# Patient Record
Sex: FEMALE | Race: WHITE | NOT HISPANIC OR LATINO | Employment: OTHER | URBAN - METROPOLITAN AREA
[De-identification: names, ages, dates, MRNs, and addresses within clinical notes are randomized per-mention and may not be internally consistent; named-entity substitution may affect disease eponyms.]

---

## 2017-04-04 ENCOUNTER — TELEPHONE (OUTPATIENT)
Dept: PHARMACY | Facility: CLINIC | Age: 62
End: 2017-04-04

## 2017-04-18 ENCOUNTER — TELEPHONE (OUTPATIENT)
Dept: PHARMACY | Facility: CLINIC | Age: 62
End: 2017-04-18

## 2017-04-18 NOTE — TELEPHONE ENCOUNTER
Congratulations    You have successfully registered for a HARVONI co-pay coupon. A copy of your co-pay coupon will be sent to the email or mailing address you provided. You may also print a copy of your co-pay coupon now. Simply click on Download co-pay coupon below to print your copy. Your unique co-pay coupon information is:    BIN: [id number]  RxPCN: [id number]  Issuer: ([id number]  Group Number: [id number]  ID#: [id number]    [facility]  [facility]  Telephone: [phone]  Fax: [phone]

## 2017-04-18 NOTE — TELEPHONE ENCOUNTER
FOR DOCUMENTATION ONLY:  Aly prior authorization approved x 8 weeks  4/17/17 through 6/12/17    [facility] is out of network.   Patient must use [facility], [facility], or [facility] pharmacies.

## 2017-05-02 NOTE — TELEPHONE ENCOUNTER
Patient mentions headaches and fatigue. She would like to take Motrin OTC for the headaches - advised that this should be fine, but if she takes tylenol, not to exceed >2000mg in 24 hours and stay well hydrated. I stressed that she should try to go about her daily activities and hopefully both side effects will dissipate. She mentions the feeling as if her BS or BP is low, that's how tired she is. It's been a while since she has seen her PCP, I advised her to make an appt for PCP to assess.

## 2020-05-11 ENCOUNTER — NURSE TRIAGE (OUTPATIENT)
Dept: ADMINISTRATIVE | Facility: CLINIC | Age: 65
End: 2020-05-11

## 2020-05-11 NOTE — TELEPHONE ENCOUNTER
Pt states in relationship with someone that has to go into other people houses and wondering he can come stay at home.Pt states she does not have any symptoms.  Informed pt that I cannot give her advice on partner coming home.    Reason for Disposition   General information question, no triage required and triager able to answer question    Protocols used: INFORMATION ONLY CALL-A-AH

## 2020-10-13 LAB — BMD RECOMMENDATION EXT: NORMAL

## 2022-03-10 ENCOUNTER — TELEPHONE (OUTPATIENT)
Dept: INTERNAL MEDICINE | Facility: CLINIC | Age: 67
End: 2022-03-10
Payer: MEDICARE

## 2022-03-21 ENCOUNTER — OFFICE VISIT (OUTPATIENT)
Dept: INTERNAL MEDICINE | Facility: CLINIC | Age: 67
End: 2022-03-21
Payer: MEDICARE

## 2022-03-21 ENCOUNTER — LAB VISIT (OUTPATIENT)
Dept: LAB | Facility: OTHER | Age: 67
End: 2022-03-21
Attending: STUDENT IN AN ORGANIZED HEALTH CARE EDUCATION/TRAINING PROGRAM
Payer: MEDICARE

## 2022-03-21 VITALS
OXYGEN SATURATION: 98 % | HEIGHT: 61 IN | SYSTOLIC BLOOD PRESSURE: 130 MMHG | DIASTOLIC BLOOD PRESSURE: 86 MMHG | BODY MASS INDEX: 20.73 KG/M2 | WEIGHT: 109.81 LBS | HEART RATE: 62 BPM

## 2022-03-21 DIAGNOSIS — B18.2 CHRONIC HEPATITIS C WITHOUT HEPATIC COMA: ICD-10-CM

## 2022-03-21 DIAGNOSIS — N95.9 POST MENOPAUSAL PROBLEMS: ICD-10-CM

## 2022-03-21 DIAGNOSIS — Z13.220 ENCOUNTER FOR LIPID SCREENING FOR CARDIOVASCULAR DISEASE: ICD-10-CM

## 2022-03-21 DIAGNOSIS — Z00.00 ANNUAL PHYSICAL EXAM: ICD-10-CM

## 2022-03-21 DIAGNOSIS — G47.00 INSOMNIA, UNSPECIFIED TYPE: ICD-10-CM

## 2022-03-21 DIAGNOSIS — Z20.2 EXPOSURE TO STD: ICD-10-CM

## 2022-03-21 DIAGNOSIS — Z00.00 ANNUAL PHYSICAL EXAM: Primary | ICD-10-CM

## 2022-03-21 DIAGNOSIS — Z12.31 ENCOUNTER FOR SCREENING MAMMOGRAM FOR MALIGNANT NEOPLASM OF BREAST: ICD-10-CM

## 2022-03-21 DIAGNOSIS — D70.8 OTHER NEUTROPENIA: ICD-10-CM

## 2022-03-21 DIAGNOSIS — Z12.11 SCREENING FOR MALIGNANT NEOPLASM OF COLON: ICD-10-CM

## 2022-03-21 DIAGNOSIS — Z13.6 ENCOUNTER FOR LIPID SCREENING FOR CARDIOVASCULAR DISEASE: ICD-10-CM

## 2022-03-21 DIAGNOSIS — M81.6 LOCALIZED OSTEOPOROSIS WITHOUT CURRENT PATHOLOGICAL FRACTURE: ICD-10-CM

## 2022-03-21 LAB
ALBUMIN SERPL BCP-MCNC: 4 G/DL (ref 3.5–5.2)
ALP SERPL-CCNC: 79 U/L (ref 55–135)
ALT SERPL W/O P-5'-P-CCNC: 12 U/L (ref 10–44)
ANION GAP SERPL CALC-SCNC: 8 MMOL/L (ref 8–16)
AST SERPL-CCNC: 17 U/L (ref 10–40)
BASOPHILS # BLD AUTO: 0.03 K/UL (ref 0–0.2)
BASOPHILS NFR BLD: 0.8 % (ref 0–1.9)
BILIRUB SERPL-MCNC: 0.6 MG/DL (ref 0.1–1)
BUN SERPL-MCNC: 14 MG/DL (ref 8–23)
CALCIUM SERPL-MCNC: 9.3 MG/DL (ref 8.7–10.5)
CHLORIDE SERPL-SCNC: 109 MMOL/L (ref 95–110)
CHOLEST SERPL-MCNC: 199 MG/DL (ref 120–199)
CHOLEST/HDLC SERPL: 2.4 {RATIO} (ref 2–5)
CO2 SERPL-SCNC: 23 MMOL/L (ref 23–29)
CREAT SERPL-MCNC: 0.7 MG/DL (ref 0.5–1.4)
DIFFERENTIAL METHOD: ABNORMAL
EOSINOPHIL # BLD AUTO: 0.2 K/UL (ref 0–0.5)
EOSINOPHIL NFR BLD: 4.8 % (ref 0–8)
ERYTHROCYTE [DISTWIDTH] IN BLOOD BY AUTOMATED COUNT: 12.3 % (ref 11.5–14.5)
EST. GFR  (AFRICAN AMERICAN): >60 ML/MIN/1.73 M^2
EST. GFR  (NON AFRICAN AMERICAN): >60 ML/MIN/1.73 M^2
GLUCOSE SERPL-MCNC: 90 MG/DL (ref 70–110)
HCT VFR BLD AUTO: 39.8 % (ref 37–48.5)
HDLC SERPL-MCNC: 83 MG/DL (ref 40–75)
HDLC SERPL: 41.7 % (ref 20–50)
HGB BLD-MCNC: 13.9 G/DL (ref 12–16)
IMM GRANULOCYTES # BLD AUTO: 0.01 K/UL (ref 0–0.04)
IMM GRANULOCYTES NFR BLD AUTO: 0.3 % (ref 0–0.5)
LDLC SERPL CALC-MCNC: 105 MG/DL (ref 63–159)
LYMPHOCYTES # BLD AUTO: 1 K/UL (ref 1–4.8)
LYMPHOCYTES NFR BLD: 27.3 % (ref 18–48)
MCH RBC QN AUTO: 32.9 PG (ref 27–31)
MCHC RBC AUTO-ENTMCNC: 34.9 G/DL (ref 32–36)
MCV RBC AUTO: 94 FL (ref 82–98)
MONOCYTES # BLD AUTO: 0.4 K/UL (ref 0.3–1)
MONOCYTES NFR BLD: 11.2 % (ref 4–15)
NEUTROPHILS # BLD AUTO: 2.1 K/UL (ref 1.8–7.7)
NEUTROPHILS NFR BLD: 55.6 % (ref 38–73)
NONHDLC SERPL-MCNC: 116 MG/DL
NRBC BLD-RTO: 0 /100 WBC
PLATELET # BLD AUTO: 232 K/UL (ref 150–450)
PMV BLD AUTO: 10.1 FL (ref 9.2–12.9)
POTASSIUM SERPL-SCNC: 4.2 MMOL/L (ref 3.5–5.1)
PROT SERPL-MCNC: 6.7 G/DL (ref 6–8.4)
RBC # BLD AUTO: 4.22 M/UL (ref 4–5.4)
SODIUM SERPL-SCNC: 140 MMOL/L (ref 136–145)
TRIGL SERPL-MCNC: 55 MG/DL (ref 30–150)
TSH SERPL DL<=0.005 MIU/L-ACNC: 1.28 UIU/ML (ref 0.4–4)
WBC # BLD AUTO: 3.74 K/UL (ref 3.9–12.7)

## 2022-03-21 PROCEDURE — 36415 COLL VENOUS BLD VENIPUNCTURE: CPT | Performed by: STUDENT IN AN ORGANIZED HEALTH CARE EDUCATION/TRAINING PROGRAM

## 2022-03-21 PROCEDURE — 87389 HIV-1 AG W/HIV-1&-2 AB AG IA: CPT | Performed by: STUDENT IN AN ORGANIZED HEALTH CARE EDUCATION/TRAINING PROGRAM

## 2022-03-21 PROCEDURE — 3288F FALL RISK ASSESSMENT DOCD: CPT | Mod: CPTII,S$GLB,, | Performed by: STUDENT IN AN ORGANIZED HEALTH CARE EDUCATION/TRAINING PROGRAM

## 2022-03-21 PROCEDURE — 87522 HEPATITIS C REVRS TRNSCRPJ: CPT | Performed by: STUDENT IN AN ORGANIZED HEALTH CARE EDUCATION/TRAINING PROGRAM

## 2022-03-21 PROCEDURE — 1101F PT FALLS ASSESS-DOCD LE1/YR: CPT | Mod: CPTII,S$GLB,, | Performed by: STUDENT IN AN ORGANIZED HEALTH CARE EDUCATION/TRAINING PROGRAM

## 2022-03-21 PROCEDURE — 3079F PR MOST RECENT DIASTOLIC BLOOD PRESSURE 80-89 MM HG: ICD-10-PCS | Mod: CPTII,S$GLB,, | Performed by: STUDENT IN AN ORGANIZED HEALTH CARE EDUCATION/TRAINING PROGRAM

## 2022-03-21 PROCEDURE — 99204 OFFICE O/P NEW MOD 45 MIN: CPT | Mod: S$GLB,,, | Performed by: STUDENT IN AN ORGANIZED HEALTH CARE EDUCATION/TRAINING PROGRAM

## 2022-03-21 PROCEDURE — 3075F PR MOST RECENT SYSTOLIC BLOOD PRESS GE 130-139MM HG: ICD-10-PCS | Mod: CPTII,S$GLB,, | Performed by: STUDENT IN AN ORGANIZED HEALTH CARE EDUCATION/TRAINING PROGRAM

## 2022-03-21 PROCEDURE — 3008F PR BODY MASS INDEX (BMI) DOCUMENTED: ICD-10-PCS | Mod: CPTII,S$GLB,, | Performed by: STUDENT IN AN ORGANIZED HEALTH CARE EDUCATION/TRAINING PROGRAM

## 2022-03-21 PROCEDURE — 1101F PR PT FALLS ASSESS DOC 0-1 FALLS W/OUT INJ PAST YR: ICD-10-PCS | Mod: CPTII,S$GLB,, | Performed by: STUDENT IN AN ORGANIZED HEALTH CARE EDUCATION/TRAINING PROGRAM

## 2022-03-21 PROCEDURE — 99204 PR OFFICE/OUTPT VISIT, NEW, LEVL IV, 45-59 MIN: ICD-10-PCS | Mod: S$GLB,,, | Performed by: STUDENT IN AN ORGANIZED HEALTH CARE EDUCATION/TRAINING PROGRAM

## 2022-03-21 PROCEDURE — 99499 RISK ADDL DX/OHS AUDIT: ICD-10-PCS | Mod: S$GLB,,, | Performed by: STUDENT IN AN ORGANIZED HEALTH CARE EDUCATION/TRAINING PROGRAM

## 2022-03-21 PROCEDURE — 80061 LIPID PANEL: CPT | Performed by: STUDENT IN AN ORGANIZED HEALTH CARE EDUCATION/TRAINING PROGRAM

## 2022-03-21 PROCEDURE — 80053 COMPREHEN METABOLIC PANEL: CPT | Performed by: STUDENT IN AN ORGANIZED HEALTH CARE EDUCATION/TRAINING PROGRAM

## 2022-03-21 PROCEDURE — 86592 SYPHILIS TEST NON-TREP QUAL: CPT | Performed by: STUDENT IN AN ORGANIZED HEALTH CARE EDUCATION/TRAINING PROGRAM

## 2022-03-21 PROCEDURE — 99499 UNLISTED E&M SERVICE: CPT | Mod: S$GLB,,, | Performed by: STUDENT IN AN ORGANIZED HEALTH CARE EDUCATION/TRAINING PROGRAM

## 2022-03-21 PROCEDURE — 84443 ASSAY THYROID STIM HORMONE: CPT | Performed by: STUDENT IN AN ORGANIZED HEALTH CARE EDUCATION/TRAINING PROGRAM

## 2022-03-21 PROCEDURE — 99999 PR PBB SHADOW E&M-EST. PATIENT-LVL III: ICD-10-PCS | Mod: PBBFAC,,, | Performed by: STUDENT IN AN ORGANIZED HEALTH CARE EDUCATION/TRAINING PROGRAM

## 2022-03-21 PROCEDURE — 1126F AMNT PAIN NOTED NONE PRSNT: CPT | Mod: CPTII,S$GLB,, | Performed by: STUDENT IN AN ORGANIZED HEALTH CARE EDUCATION/TRAINING PROGRAM

## 2022-03-21 PROCEDURE — 3079F DIAST BP 80-89 MM HG: CPT | Mod: CPTII,S$GLB,, | Performed by: STUDENT IN AN ORGANIZED HEALTH CARE EDUCATION/TRAINING PROGRAM

## 2022-03-21 PROCEDURE — 3075F SYST BP GE 130 - 139MM HG: CPT | Mod: CPTII,S$GLB,, | Performed by: STUDENT IN AN ORGANIZED HEALTH CARE EDUCATION/TRAINING PROGRAM

## 2022-03-21 PROCEDURE — 1159F MED LIST DOCD IN RCRD: CPT | Mod: CPTII,S$GLB,, | Performed by: STUDENT IN AN ORGANIZED HEALTH CARE EDUCATION/TRAINING PROGRAM

## 2022-03-21 PROCEDURE — 3288F PR FALLS RISK ASSESSMENT DOCUMENTED: ICD-10-PCS | Mod: CPTII,S$GLB,, | Performed by: STUDENT IN AN ORGANIZED HEALTH CARE EDUCATION/TRAINING PROGRAM

## 2022-03-21 PROCEDURE — 3008F BODY MASS INDEX DOCD: CPT | Mod: CPTII,S$GLB,, | Performed by: STUDENT IN AN ORGANIZED HEALTH CARE EDUCATION/TRAINING PROGRAM

## 2022-03-21 PROCEDURE — 1159F PR MEDICATION LIST DOCUMENTED IN MEDICAL RECORD: ICD-10-PCS | Mod: CPTII,S$GLB,, | Performed by: STUDENT IN AN ORGANIZED HEALTH CARE EDUCATION/TRAINING PROGRAM

## 2022-03-21 PROCEDURE — 85025 COMPLETE CBC W/AUTO DIFF WBC: CPT | Performed by: STUDENT IN AN ORGANIZED HEALTH CARE EDUCATION/TRAINING PROGRAM

## 2022-03-21 PROCEDURE — 99999 PR PBB SHADOW E&M-EST. PATIENT-LVL III: CPT | Mod: PBBFAC,,, | Performed by: STUDENT IN AN ORGANIZED HEALTH CARE EDUCATION/TRAINING PROGRAM

## 2022-03-21 PROCEDURE — 1126F PR PAIN SEVERITY QUANTIFIED, NO PAIN PRESENT: ICD-10-PCS | Mod: CPTII,S$GLB,, | Performed by: STUDENT IN AN ORGANIZED HEALTH CARE EDUCATION/TRAINING PROGRAM

## 2022-03-21 NOTE — PROGRESS NOTES
"  [facility]    Subjective:       Patient ID: Anh San is a 66 y.o. female.    Chief Complaint: Establish Care      History was obtained from the patient and supplemented through chart review.  This patient has not been seen by an [facility] internal medicine physician in the past 3 years and is new to me.    HPI:    Patient is a 66 y.o. female who presents to establish care.    Elevated BP without diagnosis of HTN  130/86, encouraged pt to monitor at home    Vegetarian since age 16  "not a big eater"   Started shakes for improved nutrition approx 2 years ago    Neutropenia  Has seen hematology in the past, was told to f/u with them  Had very expensive lab work in the past  Was concerned and worked up as a kid; there had been a biopsy in the past    Hx of extreme vertigo  Low phosphorus    Hx of hep c s/p treatment  Check RNA    Acupuncturist  Helps with recent foot exercise    Osteoporosis  Taking K2, ca from algea, ashwagagonda, kersitin, tumeric, superbeets  Sprints and walks with dog  Yoga in the past  Wants to avoid real osteoporosis med; wants to increase weight-based exercises   Declines fosamax or alternative; dislikes the side effects  Was slowed by foot injury; willing to reconsider the issue in the future    Hx of carpal tunnel     Takes cbd evening    Very interested in holistic treatments, not interested in invasive treatments  Wants to ride horses, climbs scaffolding, working to be safer, ceiling     Medical History  History reviewed. No pertinent past medical history.     Review of Systems   Constitutional: Negative for fever.   HENT: Negative for trouble swallowing.    Respiratory: Negative for shortness of breath.    Cardiovascular: Negative for chest pain.   Gastrointestinal: Negative for constipation, diarrhea, nausea and vomiting.   Musculoskeletal: Positive for arthralgias (right- improved). Negative for gait problem.        Arthalgias in general improved " "  Neurological: Negative for dizziness and seizures.   Psychiatric/Behavioral: Negative for hallucinations.         Surgical hx, family hx, social hx   Have been reviewed    No current outpatient medications on file.    Objective:        Body mass index is 20.74 kg/m².  Vitals:    03/21/22 1204   BP: 130/86   Pulse: 62   SpO2: 98%   Weight: 49.8 kg (109 lb 12.6 oz)   Height: 5' 1" (1.549 m)   PainSc: 0-No pain     Physical Exam  Vitals and nursing note reviewed.   Constitutional:       General: She is not in acute distress.     Appearance: Normal appearance. She is not ill-appearing.   HENT:      Head: Normocephalic and atraumatic.      Nose:      Comments: Wearing a mask  Eyes:      General: No scleral icterus.  Pulmonary:      Effort: Pulmonary effort is normal.   Abdominal:      General: There is no distension.   Musculoskeletal:         General: No deformity.      Cervical back: Normal range of motion.   Skin:     General: Skin is warm and dry.   Neurological:      Mental Status: She is alert and oriented to person, place, and time.   Psychiatric:         Behavior: Behavior normal.           Lab Results   Component Value Date    WBC 3.74 (L) 03/21/2022    HGB 13.9 03/21/2022    HCT 39.8 03/21/2022     03/21/2022    ALT 12 03/21/2022    AST 17 03/21/2022     03/21/2022    K 4.2 03/21/2022     03/21/2022    CREATININE 0.7 03/21/2022    BUN 14 03/21/2022    CO2 23 03/21/2022    TSH 1.280 03/21/2022       The ASCVD Risk score (Witten DC Jr., et al., 2013) failed to calculate for the following reasons:    Cannot find a previous HDL lab    Cannot find a previous total cholesterol lab    (Imaging have been independently reviewed)    Assessment:         1. Annual physical exam    2. Exposure to STD    3. Chronic hepatitis C without hepatic coma    4. Other neutropenia    5. Localized osteoporosis without current pathological fracture    6. Encounter for lipid screening for cardiovascular disease    7. " Insomnia, unspecified type    8. Encounter for screening mammogram for malignant neoplasm of breast    9. Post menopausal problems    10. Screening for malignant neoplasm of colon          Plan:     Anh was seen today for establish care.    Diagnoses and all orders for this visit:    Annual physical exam  -     Comprehensive Metabolic Panel; Future    Exposure to STD  -     RPR; Future  -     HIV 1/2 Ag/Ab (4th Gen); Future  -     C. trachomatis/N. gonorrhoeae by AMP DNA [facility]; Urine; Future    Chronic hepatitis C without hepatic coma  -     HEPATITIS C RNA, QUANTITATIVE, PCR; Future    Other neutropenia  -     CBC Auto Differential; Future  -     Ambulatory referral/consult to Hematology / Oncology; Future    Localized osteoporosis without current pathological fracture    Encounter for lipid screening for cardiovascular disease  -     Lipid Panel; Future    Insomnia, unspecified type  -     TSH; Future    Encounter for screening mammogram for malignant neoplasm of breast  -     Mammo Digital Screening Bilat; Future    Post menopausal problems  -     DXA Bone Density Spine And Hip; Future    Screening for malignant neoplasm of colon  -     Cologuard Screening (Multitarget Stool DNA); Future  -     Cologuard Screening (Multitarget Stool DNA)        Health Maintenance  - Lipids: ordered  - A1C: ordered  - Colon Ca Screen:  cologuard  - Immunizations: cov vax and boosted    Women's health  - Pap:   - Mammo: ordered  - Dexa: Due Dec 2022; pt may want to recheck early this summer    Follow up in about 6 months (around 9/21/2022).        All medications were reviewed including potential side effects and risks/benefits.  Pt was counseled to call back if anything worsens or if questions arise.    Darvin Arreola MD  Family Medicine  [facility]  [address] [place] [id number]  Phone [phone]  Fax [phone]

## 2022-03-23 LAB
HIV 1+2 AB+HIV1 P24 AG SERPL QL IA: NEGATIVE
RPR SER QL: NORMAL

## 2022-03-24 LAB — HEPATITIS C VIRUS (HCV) RNA DETECTION/QUANTIFICATION RT-PCR: NORMAL IU/ML

## 2022-04-12 ENCOUNTER — OFFICE VISIT (OUTPATIENT)
Dept: HEMATOLOGY/ONCOLOGY | Facility: CLINIC | Age: 67
End: 2022-04-12
Payer: MEDICARE

## 2022-04-12 VITALS
RESPIRATION RATE: 16 BRPM | SYSTOLIC BLOOD PRESSURE: 123 MMHG | OXYGEN SATURATION: 99 % | WEIGHT: 110 LBS | HEART RATE: 60 BPM | TEMPERATURE: 97 F | BODY MASS INDEX: 20.77 KG/M2 | DIASTOLIC BLOOD PRESSURE: 81 MMHG | HEIGHT: 61 IN

## 2022-04-12 DIAGNOSIS — B18.2 CHRONIC HEPATITIS C WITHOUT HEPATIC COMA: ICD-10-CM

## 2022-04-12 DIAGNOSIS — D72.819 LEUKOPENIA, UNSPECIFIED TYPE: Primary | ICD-10-CM

## 2022-04-12 PROCEDURE — 3288F PR FALLS RISK ASSESSMENT DOCUMENTED: ICD-10-PCS | Mod: CPTII,S$GLB,, | Performed by: STUDENT IN AN ORGANIZED HEALTH CARE EDUCATION/TRAINING PROGRAM

## 2022-04-12 PROCEDURE — 1101F PR PT FALLS ASSESS DOC 0-1 FALLS W/OUT INJ PAST YR: ICD-10-PCS | Mod: CPTII,S$GLB,, | Performed by: STUDENT IN AN ORGANIZED HEALTH CARE EDUCATION/TRAINING PROGRAM

## 2022-04-12 PROCEDURE — 1101F PT FALLS ASSESS-DOCD LE1/YR: CPT | Mod: CPTII,S$GLB,, | Performed by: STUDENT IN AN ORGANIZED HEALTH CARE EDUCATION/TRAINING PROGRAM

## 2022-04-12 PROCEDURE — 3079F PR MOST RECENT DIASTOLIC BLOOD PRESSURE 80-89 MM HG: ICD-10-PCS | Mod: CPTII,S$GLB,, | Performed by: STUDENT IN AN ORGANIZED HEALTH CARE EDUCATION/TRAINING PROGRAM

## 2022-04-12 PROCEDURE — 1126F PR PAIN SEVERITY QUANTIFIED, NO PAIN PRESENT: ICD-10-PCS | Mod: CPTII,S$GLB,, | Performed by: STUDENT IN AN ORGANIZED HEALTH CARE EDUCATION/TRAINING PROGRAM

## 2022-04-12 PROCEDURE — 3074F PR MOST RECENT SYSTOLIC BLOOD PRESSURE < 130 MM HG: ICD-10-PCS | Mod: CPTII,S$GLB,, | Performed by: STUDENT IN AN ORGANIZED HEALTH CARE EDUCATION/TRAINING PROGRAM

## 2022-04-12 PROCEDURE — 99999 PR PBB SHADOW E&M-EST. PATIENT-LVL III: ICD-10-PCS | Mod: PBBFAC,,, | Performed by: STUDENT IN AN ORGANIZED HEALTH CARE EDUCATION/TRAINING PROGRAM

## 2022-04-12 PROCEDURE — 3288F FALL RISK ASSESSMENT DOCD: CPT | Mod: CPTII,S$GLB,, | Performed by: STUDENT IN AN ORGANIZED HEALTH CARE EDUCATION/TRAINING PROGRAM

## 2022-04-12 PROCEDURE — 3079F DIAST BP 80-89 MM HG: CPT | Mod: CPTII,S$GLB,, | Performed by: STUDENT IN AN ORGANIZED HEALTH CARE EDUCATION/TRAINING PROGRAM

## 2022-04-12 PROCEDURE — 1160F RVW MEDS BY RX/DR IN RCRD: CPT | Mod: CPTII,S$GLB,, | Performed by: STUDENT IN AN ORGANIZED HEALTH CARE EDUCATION/TRAINING PROGRAM

## 2022-04-12 PROCEDURE — 3008F PR BODY MASS INDEX (BMI) DOCUMENTED: ICD-10-PCS | Mod: CPTII,S$GLB,, | Performed by: STUDENT IN AN ORGANIZED HEALTH CARE EDUCATION/TRAINING PROGRAM

## 2022-04-12 PROCEDURE — 1159F PR MEDICATION LIST DOCUMENTED IN MEDICAL RECORD: ICD-10-PCS | Mod: CPTII,S$GLB,, | Performed by: STUDENT IN AN ORGANIZED HEALTH CARE EDUCATION/TRAINING PROGRAM

## 2022-04-12 PROCEDURE — 3074F SYST BP LT 130 MM HG: CPT | Mod: CPTII,S$GLB,, | Performed by: STUDENT IN AN ORGANIZED HEALTH CARE EDUCATION/TRAINING PROGRAM

## 2022-04-12 PROCEDURE — 1160F PR REVIEW ALL MEDS BY PRESCRIBER/CLIN PHARMACIST DOCUMENTED: ICD-10-PCS | Mod: CPTII,S$GLB,, | Performed by: STUDENT IN AN ORGANIZED HEALTH CARE EDUCATION/TRAINING PROGRAM

## 2022-04-12 PROCEDURE — 99203 PR OFFICE/OUTPT VISIT, NEW, LEVL III, 30-44 MIN: ICD-10-PCS | Mod: S$GLB,,, | Performed by: STUDENT IN AN ORGANIZED HEALTH CARE EDUCATION/TRAINING PROGRAM

## 2022-04-12 PROCEDURE — 1126F AMNT PAIN NOTED NONE PRSNT: CPT | Mod: CPTII,S$GLB,, | Performed by: STUDENT IN AN ORGANIZED HEALTH CARE EDUCATION/TRAINING PROGRAM

## 2022-04-12 PROCEDURE — 99999 PR PBB SHADOW E&M-EST. PATIENT-LVL III: CPT | Mod: PBBFAC,,, | Performed by: STUDENT IN AN ORGANIZED HEALTH CARE EDUCATION/TRAINING PROGRAM

## 2022-04-12 PROCEDURE — 1159F MED LIST DOCD IN RCRD: CPT | Mod: CPTII,S$GLB,, | Performed by: STUDENT IN AN ORGANIZED HEALTH CARE EDUCATION/TRAINING PROGRAM

## 2022-04-12 PROCEDURE — 99203 OFFICE O/P NEW LOW 30 MIN: CPT | Mod: S$GLB,,, | Performed by: STUDENT IN AN ORGANIZED HEALTH CARE EDUCATION/TRAINING PROGRAM

## 2022-04-12 PROCEDURE — 3008F BODY MASS INDEX DOCD: CPT | Mod: CPTII,S$GLB,, | Performed by: STUDENT IN AN ORGANIZED HEALTH CARE EDUCATION/TRAINING PROGRAM

## 2022-04-12 NOTE — PROGRESS NOTES
Hematology- Oncology Clinic Note :      4/12/2022    RFV / chief complaint- Referral (Neutropenia.. being proactive. )        HPI  Pt is a 66 y.o. female who  has no past medical history on file.   Pt presents to the clinic today for leukopenia    Pt reports to be in her usual state of health. She wanted to be proactive and get evaluated.   She has long standing hx of leukopenia. She underwent BMBx at age 28 for leukopenia.     No recurrent infections. She has b symptoms. Good appetite.  Accompanied by son today.   She has been cured for Hep C     She drinks wine every night or so          Reviewed past medical/surgical/social history    History reviewed. No pertinent past medical history.   History reviewed. No pertinent surgical history.   Review of patient's allergies indicates:  No Known Allergies   Social History     Tobacco Use    Smoking status: Never Smoker    Smokeless tobacco: Never Used   Substance Use Topics    Alcohol use: Yes     Alcohol/week: 7.0 standard drinks     Types: 7 Glasses of wine per week      Family History   Problem Relation Age of Onset    Multiple sclerosis Mother         late onset    Hypertension Father     Hypertension Sister     Coronary artery disease Sister         stents    Heart attack Sister     HIV Brother     Stroke Maternal Grandmother     Colon cancer Neg Hx     Breast cancer Neg Hx     Ovarian cancer Neg Hx           Review of Systems :  Review of Systems   Constitutional: Negative for chills, diaphoresis, fever, malaise/fatigue and weight loss.   HENT: Negative.  Negative for congestion, hearing loss, nosebleeds, sore throat and tinnitus.    Eyes: Negative.  Negative for blurred vision and discharge.   Respiratory: Negative for cough, hemoptysis, sputum production, shortness of breath and wheezing.    Cardiovascular: Negative.  Negative for chest pain, palpitations and leg swelling.   Gastrointestinal: Negative.  Negative for abdominal pain, blood in stool,  "constipation, diarrhea, heartburn, melena, nausea and vomiting.   Genitourinary: Negative.    Musculoskeletal: Negative.  Negative for back pain, falls, joint pain and myalgias.   Skin: Negative.  Negative for itching and rash.   Neurological: Negative.  Negative for dizziness, tingling, sensory change, speech change, focal weakness, seizures, loss of consciousness, weakness and headaches.   Endo/Heme/Allergies: Negative.  Does not bruise/bleed easily.   Psychiatric/Behavioral: Negative.  Negative for depression. The patient is not nervous/anxious and does not have insomnia.                Physical Exam :  /81 (BP Location: Left arm, Patient Position: Sitting, BP Method: Small (Automatic))   Pulse 60   Temp 97.4 °F (36.3 °C) (Oral)   Resp 16   Ht 5' 1" (1.549 m)   Wt 49.9 kg (110 lb 0.2 oz)   SpO2 99%   BMI 20.79 kg/m²   Wt Readings from Last 3 Encounters:   04/12/22 49.9 kg (110 lb 0.2 oz)   03/21/22 49.8 kg (109 lb 12.6 oz)       Body mass index is 20.79 kg/m².      Physical Exam  Vitals and nursing note reviewed.   Constitutional:       General: She is not in acute distress.     Appearance: Normal appearance. She is not ill-appearing.   HENT:      Head: Normocephalic and atraumatic.      Right Ear: External ear normal.      Left Ear: External ear normal.      Mouth/Throat:      Pharynx: No oropharyngeal exudate.   Eyes:      General: No scleral icterus.        Right eye: No discharge.         Left eye: No discharge.   Cardiovascular:      Rate and Rhythm: Normal rate.   Pulmonary:      Effort: Pulmonary effort is normal. No respiratory distress.   Abdominal:      General: There is no distension.   Musculoskeletal:         General: No swelling or deformity.      Cervical back: Normal range of motion and neck supple.      Right lower leg: No edema.      Left lower leg: No edema.   Skin:     Coloration: Skin is not jaundiced.      Findings: No bruising, erythema, lesion or rash.   Neurological:      " General: No focal deficit present.      Mental Status: She is alert and oriented to person, place, and time. Mental status is at baseline.      Coordination: Coordination normal.      Gait: Gait normal.   Psychiatric:         Mood and Affect: Mood normal.         Behavior: Behavior normal.             No current outpatient medications on file.     No current facility-administered medications for this visit.       Pertinent Diagnostic studies:      No visits with results within 1 Week(s) from this visit.   Latest known visit with results is:   Lab Visit on 03/21/2022   Component Date Value Ref Range Status    Chlamydia, Amplified DNA 03/21/2022 Not Detected  Not Detected Final    N gonorrhoeae, amplified DNA 03/21/2022 Not Detected  Not Detected Final           Oncology History    No history exists.     Assessment :       1. Leukopenia, unspecified type    2. Chronic hepatitis C without hepatic coma        Plan :       Leukopenia , ANC wnl   Not symptomatic.   Could be benign ethnical/ constitutional   Multiple Qs and concerns were addressed during this visit   RTC 1 year with labs    Hep C s/p treatment. Viral load undetectable.            Electronically signed by Keyla Yanes    [facility]      Future Appointments   Date Time Provider Department Center   5/10/2022 11:00 AM [facility] MAMMO1 [facility] MAMMO [facility]   5/10/2022 11:20 AM [facility] DEXA1 [facility] BMD [facility]   9/21/2022 11:00 AM [facility]           This note was created with voice recognition software.  Grammatical, syntax and spelling errors may be inevitable.

## 2022-05-30 ENCOUNTER — PATIENT MESSAGE (OUTPATIENT)
Dept: ADMINISTRATIVE | Facility: HOSPITAL | Age: 67
End: 2022-05-30
Payer: MEDICARE

## 2022-06-27 ENCOUNTER — LAB VISIT (OUTPATIENT)
Dept: LAB | Facility: OTHER | Age: 67
End: 2022-06-27
Attending: STUDENT IN AN ORGANIZED HEALTH CARE EDUCATION/TRAINING PROGRAM
Payer: MEDICARE

## 2022-06-27 ENCOUNTER — OFFICE VISIT (OUTPATIENT)
Dept: INTERNAL MEDICINE | Facility: CLINIC | Age: 67
End: 2022-06-27
Payer: MEDICARE

## 2022-06-27 VITALS
SYSTOLIC BLOOD PRESSURE: 124 MMHG | BODY MASS INDEX: 20.66 KG/M2 | HEART RATE: 68 BPM | WEIGHT: 109.38 LBS | OXYGEN SATURATION: 98 % | DIASTOLIC BLOOD PRESSURE: 72 MMHG

## 2022-06-27 DIAGNOSIS — R22.32 ARM MASS, LEFT: Primary | ICD-10-CM

## 2022-06-27 DIAGNOSIS — D72.819 LEUKOPENIA, UNSPECIFIED TYPE: ICD-10-CM

## 2022-06-27 LAB
BASOPHILS # BLD AUTO: 0.03 K/UL (ref 0–0.2)
BASOPHILS NFR BLD: 0.9 % (ref 0–1.9)
DIFFERENTIAL METHOD: ABNORMAL
EOSINOPHIL # BLD AUTO: 0.1 K/UL (ref 0–0.5)
EOSINOPHIL NFR BLD: 3.4 % (ref 0–8)
ERYTHROCYTE [DISTWIDTH] IN BLOOD BY AUTOMATED COUNT: 12 % (ref 11.5–14.5)
FOLATE SERPL-MCNC: 11.7 NG/ML (ref 4–24)
HCT VFR BLD AUTO: 40.6 % (ref 37–48.5)
HGB BLD-MCNC: 13.9 G/DL (ref 12–16)
IMM GRANULOCYTES # BLD AUTO: 0.01 K/UL (ref 0–0.04)
IMM GRANULOCYTES NFR BLD AUTO: 0.3 % (ref 0–0.5)
LYMPHOCYTES # BLD AUTO: 1 K/UL (ref 1–4.8)
LYMPHOCYTES NFR BLD: 30.8 % (ref 18–48)
MCH RBC QN AUTO: 32 PG (ref 27–31)
MCHC RBC AUTO-ENTMCNC: 34.2 G/DL (ref 32–36)
MCV RBC AUTO: 93 FL (ref 82–98)
MONOCYTES # BLD AUTO: 0.3 K/UL (ref 0.3–1)
MONOCYTES NFR BLD: 8.6 % (ref 4–15)
NEUTROPHILS # BLD AUTO: 1.8 K/UL (ref 1.8–7.7)
NEUTROPHILS NFR BLD: 56 % (ref 38–73)
NRBC BLD-RTO: 0 /100 WBC
PLATELET # BLD AUTO: 235 K/UL (ref 150–450)
PMV BLD AUTO: 10.4 FL (ref 9.2–12.9)
RBC # BLD AUTO: 4.35 M/UL (ref 4–5.4)
VIT B12 SERPL-MCNC: 472 PG/ML (ref 210–950)
WBC # BLD AUTO: 3.25 K/UL (ref 3.9–12.7)

## 2022-06-27 PROCEDURE — 84165 PROTEIN E-PHORESIS SERUM: CPT | Performed by: STUDENT IN AN ORGANIZED HEALTH CARE EDUCATION/TRAINING PROGRAM

## 2022-06-27 PROCEDURE — 86334 IMMUNOFIX E-PHORESIS SERUM: CPT | Mod: 26,,, | Performed by: PATHOLOGY

## 2022-06-27 PROCEDURE — 3008F PR BODY MASS INDEX (BMI) DOCUMENTED: ICD-10-PCS | Mod: CPTII,S$GLB,, | Performed by: PHYSICIAN ASSISTANT

## 2022-06-27 PROCEDURE — 86334 IMMUNOFIX E-PHORESIS SERUM: CPT | Performed by: STUDENT IN AN ORGANIZED HEALTH CARE EDUCATION/TRAINING PROGRAM

## 2022-06-27 PROCEDURE — 1101F PT FALLS ASSESS-DOCD LE1/YR: CPT | Mod: CPTII,S$GLB,, | Performed by: PHYSICIAN ASSISTANT

## 2022-06-27 PROCEDURE — 82607 VITAMIN B-12: CPT | Mod: GA | Performed by: STUDENT IN AN ORGANIZED HEALTH CARE EDUCATION/TRAINING PROGRAM

## 2022-06-27 PROCEDURE — 84165 PATHOLOGIST INTERPRETATION SPE: ICD-10-PCS | Mod: 26,,, | Performed by: PATHOLOGY

## 2022-06-27 PROCEDURE — 3008F BODY MASS INDEX DOCD: CPT | Mod: CPTII,S$GLB,, | Performed by: PHYSICIAN ASSISTANT

## 2022-06-27 PROCEDURE — 1159F PR MEDICATION LIST DOCUMENTED IN MEDICAL RECORD: ICD-10-PCS | Mod: CPTII,S$GLB,, | Performed by: PHYSICIAN ASSISTANT

## 2022-06-27 PROCEDURE — 86334 PATHOLOGIST INTERPRETATION IFE: ICD-10-PCS | Mod: 26,,, | Performed by: PATHOLOGY

## 2022-06-27 PROCEDURE — 1126F PR PAIN SEVERITY QUANTIFIED, NO PAIN PRESENT: ICD-10-PCS | Mod: CPTII,S$GLB,, | Performed by: PHYSICIAN ASSISTANT

## 2022-06-27 PROCEDURE — 82746 ASSAY OF FOLIC ACID SERUM: CPT | Mod: GA | Performed by: STUDENT IN AN ORGANIZED HEALTH CARE EDUCATION/TRAINING PROGRAM

## 2022-06-27 PROCEDURE — 99999 PR PBB SHADOW E&M-EST. PATIENT-LVL III: CPT | Mod: PBBFAC,,, | Performed by: PHYSICIAN ASSISTANT

## 2022-06-27 PROCEDURE — 85025 COMPLETE CBC W/AUTO DIFF WBC: CPT | Performed by: STUDENT IN AN ORGANIZED HEALTH CARE EDUCATION/TRAINING PROGRAM

## 2022-06-27 PROCEDURE — 99214 PR OFFICE/OUTPT VISIT, EST, LEVL IV, 30-39 MIN: ICD-10-PCS | Mod: S$GLB,,, | Performed by: PHYSICIAN ASSISTANT

## 2022-06-27 PROCEDURE — 3074F SYST BP LT 130 MM HG: CPT | Mod: CPTII,S$GLB,, | Performed by: PHYSICIAN ASSISTANT

## 2022-06-27 PROCEDURE — 1126F AMNT PAIN NOTED NONE PRSNT: CPT | Mod: CPTII,S$GLB,, | Performed by: PHYSICIAN ASSISTANT

## 2022-06-27 PROCEDURE — 3078F DIAST BP <80 MM HG: CPT | Mod: CPTII,S$GLB,, | Performed by: PHYSICIAN ASSISTANT

## 2022-06-27 PROCEDURE — 99214 OFFICE O/P EST MOD 30 MIN: CPT | Mod: S$GLB,,, | Performed by: PHYSICIAN ASSISTANT

## 2022-06-27 PROCEDURE — 99999 PR PBB SHADOW E&M-EST. PATIENT-LVL III: ICD-10-PCS | Mod: PBBFAC,,, | Performed by: PHYSICIAN ASSISTANT

## 2022-06-27 PROCEDURE — 3288F PR FALLS RISK ASSESSMENT DOCUMENTED: ICD-10-PCS | Mod: CPTII,S$GLB,, | Performed by: PHYSICIAN ASSISTANT

## 2022-06-27 PROCEDURE — 83520 IMMUNOASSAY QUANT NOS NONAB: CPT | Performed by: STUDENT IN AN ORGANIZED HEALTH CARE EDUCATION/TRAINING PROGRAM

## 2022-06-27 PROCEDURE — 1101F PR PT FALLS ASSESS DOC 0-1 FALLS W/OUT INJ PAST YR: ICD-10-PCS | Mod: CPTII,S$GLB,, | Performed by: PHYSICIAN ASSISTANT

## 2022-06-27 PROCEDURE — 1160F PR REVIEW ALL MEDS BY PRESCRIBER/CLIN PHARMACIST DOCUMENTED: ICD-10-PCS | Mod: CPTII,S$GLB,, | Performed by: PHYSICIAN ASSISTANT

## 2022-06-27 PROCEDURE — 3078F PR MOST RECENT DIASTOLIC BLOOD PRESSURE < 80 MM HG: ICD-10-PCS | Mod: CPTII,S$GLB,, | Performed by: PHYSICIAN ASSISTANT

## 2022-06-27 PROCEDURE — 83921 ORGANIC ACID SINGLE QUANT: CPT | Performed by: STUDENT IN AN ORGANIZED HEALTH CARE EDUCATION/TRAINING PROGRAM

## 2022-06-27 PROCEDURE — 84165 PROTEIN E-PHORESIS SERUM: CPT | Mod: 26,,, | Performed by: PATHOLOGY

## 2022-06-27 PROCEDURE — 1160F RVW MEDS BY RX/DR IN RCRD: CPT | Mod: CPTII,S$GLB,, | Performed by: PHYSICIAN ASSISTANT

## 2022-06-27 PROCEDURE — 1159F MED LIST DOCD IN RCRD: CPT | Mod: CPTII,S$GLB,, | Performed by: PHYSICIAN ASSISTANT

## 2022-06-27 PROCEDURE — 3288F FALL RISK ASSESSMENT DOCD: CPT | Mod: CPTII,S$GLB,, | Performed by: PHYSICIAN ASSISTANT

## 2022-06-27 PROCEDURE — 3074F PR MOST RECENT SYSTOLIC BLOOD PRESSURE < 130 MM HG: ICD-10-PCS | Mod: CPTII,S$GLB,, | Performed by: PHYSICIAN ASSISTANT

## 2022-06-27 NOTE — PROGRESS NOTES
INTERNAL MEDICINE URGENT VISIT NOTE    CHIEF COMPLAINT     Chief Complaint   Patient presents with    Cyst       HPI     Anh San is a 66 y.o. female who presents for an urgent visit today.    PCP is Darvin Arreola MD, patient is new to me.   She presents with complaints of two areas of concern on her left forearm. She reports that over the past two week she has noticed two small lumps that are non-tender, mobile and unchanging. She denies history of such lesions and has no other lesions to report. She denies fever, chills, nausea, vomiting, chest pain or SOB.       Past Medical History:  No past medical history on file.    Home Medications:  Prior to Admission medications    Not on File       Review of Systems:  Review of Systems   Constitutional: Negative for chills and fever.   HENT: Negative for sore throat and trouble swallowing.    Eyes: Negative for visual disturbance.   Respiratory: Negative for cough and shortness of breath.    Cardiovascular: Negative for chest pain.   Gastrointestinal: Negative for abdominal pain, constipation, diarrhea, nausea and vomiting.   Genitourinary: Negative for dysuria and flank pain.   Musculoskeletal: Negative for back pain, neck pain and neck stiffness.   Skin: Negative for rash.        Nodules to the left forearm    Neurological: Negative for dizziness, syncope, weakness and headaches.   Psychiatric/Behavioral: Negative for confusion.       Health Maintainence:   Immunizations:  Health Maintenance       Date Due Completion Date    Pneumococcal Vaccines (Age 65+) (1 - PCV) Never done ---    TETANUS VACCINE Never done ---    Mammogram Never done ---    Shingles Vaccine (1 of 2) Never done ---    DEXA Scan Never done ---    Colorectal Cancer Screening Never done ---    COVID-19 Vaccine (4 - Booster for Pfizer series) 03/12/2022 11/12/2021    Influenza Vaccine (Season Ended) 09/01/2022 ---    Lipid Panel 03/21/2027 3/21/2022           PHYSICAL EXAM     BP  124/72 (BP Location: Right arm, Patient Position: Sitting)   Pulse 68   Wt 49.6 kg (109 lb 5.6 oz)   SpO2 98%   BMI 20.66 kg/m²     Physical Exam  Vitals and nursing note reviewed.   Constitutional:       Appearance: Normal appearance.      Comments: Healthy appearing female in NAD or apparent pain. She makes good eye contact, speaks in clear full sentences and ambulates with ease.      HENT:      Head: Normocephalic and atraumatic.      Nose: Nose normal.      Mouth/Throat:      Pharynx: Oropharynx is clear.   Eyes:      Conjunctiva/sclera: Conjunctivae normal.   Cardiovascular:      Rate and Rhythm: Normal rate and regular rhythm.      Pulses: Normal pulses.   Pulmonary:      Effort: No respiratory distress.   Abdominal:      Tenderness: There is no abdominal tenderness.   Musculoskeletal:         General: Normal range of motion.      Cervical back: No rigidity.   Skin:     General: Skin is warm and dry.      Capillary Refill: Capillary refill takes less than 2 seconds.      Findings: No rash.          Neurological:      General: No focal deficit present.      Mental Status: She is alert.      Gait: Gait normal.   Psychiatric:         Mood and Affect: Mood normal.         LABS     No results found for: LABA1C, HGBA1C  CMP  Sodium   Date Value Ref Range Status   03/21/2022 140 136 - 145 mmol/L Final     Potassium   Date Value Ref Range Status   03/21/2022 4.2 3.5 - 5.1 mmol/L Final     Chloride   Date Value Ref Range Status   03/21/2022 109 95 - 110 mmol/L Final     CO2   Date Value Ref Range Status   03/21/2022 23 23 - 29 mmol/L Final     Glucose   Date Value Ref Range Status   03/21/2022 90 70 - 110 mg/dL Final     BUN   Date Value Ref Range Status   03/21/2022 14 8 - 23 mg/dL Final     Creatinine   Date Value Ref Range Status   03/21/2022 0.7 0.5 - 1.4 mg/dL Final     Calcium   Date Value Ref Range Status   03/21/2022 9.3 8.7 - 10.5 mg/dL Final     Total Protein   Date Value Ref Range Status   03/21/2022 6.7  6.0 - 8.4 g/dL Final     Albumin   Date Value Ref Range Status   03/21/2022 4.0 3.5 - 5.2 g/dL Final     Total Bilirubin   Date Value Ref Range Status   03/21/2022 0.6 0.1 - 1.0 mg/dL Final     Comment:     For infants and newborns, interpretation of results should be based  on gestational age, weight and in agreement with clinical  observations.    Premature Infant recommended reference ranges:  Up to 24 hours.............<8.0 mg/dL  Up to 48 hours............<12.0 mg/dL  3-5 days..................<15.0 mg/dL  6-29 days.................<15.0 mg/dL       Alkaline Phosphatase   Date Value Ref Range Status   03/21/2022 79 55 - 135 U/L Final     AST   Date Value Ref Range Status   03/21/2022 17 10 - 40 U/L Final     ALT   Date Value Ref Range Status   03/21/2022 12 10 - 44 U/L Final     Anion Gap   Date Value Ref Range Status   03/21/2022 8 8 - 16 mmol/L Final     eGFR if    Date Value Ref Range Status   03/21/2022 >60 >60 mL/min/1.73 m^2 Final     eGFR if non    Date Value Ref Range Status   03/21/2022 >60 >60 mL/min/1.73 m^2 Final     Comment:     Calculation used to obtain the estimated glomerular filtration  rate (eGFR) is the CKD-EPI equation.        Lab Results   Component Value Date    WBC 3.74 (L) 03/21/2022    HGB 13.9 03/21/2022    HCT 39.8 03/21/2022    MCV 94 03/21/2022     03/21/2022     Lab Results   Component Value Date    CHOL 199 03/21/2022     Lab Results   Component Value Date    HDL 83 (H) 03/21/2022     Lab Results   Component Value Date    LDLCALC 105.0 03/21/2022     Lab Results   Component Value Date    TRIG 55 03/21/2022     Lab Results   Component Value Date    CHOLHDL 41.7 03/21/2022     Lab Results   Component Value Date    TSH 1.280 03/21/2022       ASSESSMENT/PLAN     Anh San is a 66 y.o. female     Anh was seen today for soft tissue nodules to the left forearm without clear etiology. The most distal lesion looks like it could be  associated with a superficial vein. The more proximal lesion looks more consistent with lipoma. Will get soft tissue US for further eval.     Diagnoses and all orders for this visit:    Arm mass, left  -     US Soft Tissue Upper Extremity, Left; Future       Patient was counseled on when and how to seek emergent care.       Virgie Tate PA-C   Department of Internal Medicine - [facility]   11:14 AM

## 2022-06-28 LAB
ALBUMIN SERPL ELPH-MCNC: 4.2 G/DL (ref 3.35–5.55)
ALPHA1 GLOB SERPL ELPH-MCNC: 0.25 G/DL (ref 0.17–0.41)
ALPHA2 GLOB SERPL ELPH-MCNC: 0.66 G/DL (ref 0.43–0.99)
B-GLOBULIN SERPL ELPH-MCNC: 0.6 G/DL (ref 0.5–1.1)
GAMMA GLOB SERPL ELPH-MCNC: 1 G/DL (ref 0.67–1.58)
INTERPRETATION SERPL IFE-IMP: NORMAL
KAPPA LC SER QL IA: 1.84 MG/DL (ref 0.33–1.94)
KAPPA LC/LAMBDA SER IA: 1.43 (ref 0.26–1.65)
LAMBDA LC SER QL IA: 1.29 MG/DL (ref 0.57–2.63)
PROT SERPL-MCNC: 6.7 G/DL (ref 6–8.4)

## 2022-06-29 ENCOUNTER — HOSPITAL ENCOUNTER (OUTPATIENT)
Dept: RADIOLOGY | Facility: OTHER | Age: 67
Discharge: HOME | End: 2022-06-29
Attending: PHYSICIAN ASSISTANT
Payer: MEDICARE

## 2022-06-29 DIAGNOSIS — R22.32 ARM MASS, LEFT: ICD-10-CM

## 2022-06-29 PROCEDURE — 76882 US LMTD JT/FCL EVL NVASC XTR: CPT | Mod: 26,LT,, | Performed by: RADIOLOGY

## 2022-06-29 PROCEDURE — 76882 US SOFT TISSUE, UPPER EXTREMITY, LEFT: ICD-10-PCS | Mod: 26,LT,, | Performed by: RADIOLOGY

## 2022-06-29 PROCEDURE — 76882 US LMTD JT/FCL EVL NVASC XTR: CPT | Mod: TC,LT

## 2022-07-01 LAB
METHYLMALONATE SERPL-SCNC: 0.29 UMOL/L
PATHOLOGIST INTERPRETATION IFE: NORMAL
PATHOLOGIST INTERPRETATION SPE: NORMAL

## 2022-08-24 ENCOUNTER — PATIENT MESSAGE (OUTPATIENT)
Dept: INTERNAL MEDICINE | Facility: CLINIC | Age: 67
End: 2022-08-24
Payer: MEDICARE

## 2022-09-27 ENCOUNTER — PES CALL (OUTPATIENT)
Dept: ADMINISTRATIVE | Facility: CLINIC | Age: 67
End: 2022-09-27
Payer: MEDICARE

## 2022-09-27 PROBLEM — D70.8 OTHER NEUTROPENIA: Status: RESOLVED | Noted: 2022-03-21 | Resolved: 2022-09-27

## 2022-09-27 PROBLEM — B18.2 CHRONIC HEPATITIS C WITHOUT HEPATIC COMA: Status: RESOLVED | Noted: 2022-03-21 | Resolved: 2022-09-27

## 2022-09-27 PROBLEM — D70.9 NEUTROPENIA: Status: ACTIVE | Noted: 2017-12-28

## 2022-10-10 ENCOUNTER — PATIENT MESSAGE (OUTPATIENT)
Dept: INTERNAL MEDICINE | Facility: CLINIC | Age: 67
End: 2022-10-10
Payer: MEDICARE

## 2022-10-10 ENCOUNTER — TELEPHONE (OUTPATIENT)
Dept: ADMINISTRATIVE | Facility: CLINIC | Age: 67
End: 2022-10-10
Payer: MEDICARE

## 2022-10-10 NOTE — TELEPHONE ENCOUNTER
Called pt, no answer; left message informing pt I was calling to remind pt of her in office EAWV on 10/12/22 and to return my call if she had any questions; left my name and number

## 2022-10-12 ENCOUNTER — OFFICE VISIT (OUTPATIENT)
Dept: INTERNAL MEDICINE | Facility: CLINIC | Age: 67
End: 2022-10-12
Payer: MEDICARE

## 2022-10-12 VITALS
SYSTOLIC BLOOD PRESSURE: 130 MMHG | WEIGHT: 109.56 LBS | BODY MASS INDEX: 21.51 KG/M2 | OXYGEN SATURATION: 98 % | HEART RATE: 66 BPM | HEIGHT: 60 IN | DIASTOLIC BLOOD PRESSURE: 78 MMHG

## 2022-10-12 DIAGNOSIS — Z00.00 ENCOUNTER FOR PREVENTIVE HEALTH EXAMINATION: Primary | ICD-10-CM

## 2022-10-12 DIAGNOSIS — D70.9 NEUTROPENIA, UNSPECIFIED TYPE: ICD-10-CM

## 2022-10-12 DIAGNOSIS — M81.6 LOCALIZED OSTEOPOROSIS WITHOUT CURRENT PATHOLOGICAL FRACTURE: ICD-10-CM

## 2022-10-12 PROCEDURE — 3008F PR BODY MASS INDEX (BMI) DOCUMENTED: ICD-10-PCS | Mod: CPTII,S$GLB,, | Performed by: NURSE PRACTITIONER

## 2022-10-12 PROCEDURE — G0402 PR WELCOME MEDICARE PREVENTIVE VISIT NEW ENROLLEE: ICD-10-PCS | Mod: S$GLB,,, | Performed by: NURSE PRACTITIONER

## 2022-10-12 PROCEDURE — G0402 INITIAL PREVENTIVE EXAM: HCPCS | Mod: S$GLB,,, | Performed by: NURSE PRACTITIONER

## 2022-10-12 PROCEDURE — 3008F BODY MASS INDEX DOCD: CPT | Mod: CPTII,S$GLB,, | Performed by: NURSE PRACTITIONER

## 2022-10-12 PROCEDURE — 3075F SYST BP GE 130 - 139MM HG: CPT | Mod: CPTII,S$GLB,, | Performed by: NURSE PRACTITIONER

## 2022-10-12 PROCEDURE — 1126F PR PAIN SEVERITY QUANTIFIED, NO PAIN PRESENT: ICD-10-PCS | Mod: CPTII,S$GLB,, | Performed by: NURSE PRACTITIONER

## 2022-10-12 PROCEDURE — 99999 PR PBB SHADOW E&M-EST. PATIENT-LVL III: ICD-10-PCS | Mod: PBBFAC,,, | Performed by: NURSE PRACTITIONER

## 2022-10-12 PROCEDURE — 3075F PR MOST RECENT SYSTOLIC BLOOD PRESS GE 130-139MM HG: ICD-10-PCS | Mod: CPTII,S$GLB,, | Performed by: NURSE PRACTITIONER

## 2022-10-12 PROCEDURE — 1160F PR REVIEW ALL MEDS BY PRESCRIBER/CLIN PHARMACIST DOCUMENTED: ICD-10-PCS | Mod: CPTII,S$GLB,, | Performed by: NURSE PRACTITIONER

## 2022-10-12 PROCEDURE — 1126F AMNT PAIN NOTED NONE PRSNT: CPT | Mod: CPTII,S$GLB,, | Performed by: NURSE PRACTITIONER

## 2022-10-12 PROCEDURE — 1159F MED LIST DOCD IN RCRD: CPT | Mod: CPTII,S$GLB,, | Performed by: NURSE PRACTITIONER

## 2022-10-12 PROCEDURE — 1170F PR FUNCTIONAL STATUS ASSESSED: ICD-10-PCS | Mod: CPTII,S$GLB,, | Performed by: NURSE PRACTITIONER

## 2022-10-12 PROCEDURE — 1170F FXNL STATUS ASSESSED: CPT | Mod: CPTII,S$GLB,, | Performed by: NURSE PRACTITIONER

## 2022-10-12 PROCEDURE — 3078F DIAST BP <80 MM HG: CPT | Mod: CPTII,S$GLB,, | Performed by: NURSE PRACTITIONER

## 2022-10-12 PROCEDURE — 1159F PR MEDICATION LIST DOCUMENTED IN MEDICAL RECORD: ICD-10-PCS | Mod: CPTII,S$GLB,, | Performed by: NURSE PRACTITIONER

## 2022-10-12 PROCEDURE — 99999 PR PBB SHADOW E&M-EST. PATIENT-LVL III: CPT | Mod: PBBFAC,,, | Performed by: NURSE PRACTITIONER

## 2022-10-12 PROCEDURE — 1101F PT FALLS ASSESS-DOCD LE1/YR: CPT | Mod: CPTII,S$GLB,, | Performed by: NURSE PRACTITIONER

## 2022-10-12 PROCEDURE — 3288F PR FALLS RISK ASSESSMENT DOCUMENTED: ICD-10-PCS | Mod: CPTII,S$GLB,, | Performed by: NURSE PRACTITIONER

## 2022-10-12 PROCEDURE — 1101F PR PT FALLS ASSESS DOC 0-1 FALLS W/OUT INJ PAST YR: ICD-10-PCS | Mod: CPTII,S$GLB,, | Performed by: NURSE PRACTITIONER

## 2022-10-12 PROCEDURE — 3288F FALL RISK ASSESSMENT DOCD: CPT | Mod: CPTII,S$GLB,, | Performed by: NURSE PRACTITIONER

## 2022-10-12 PROCEDURE — 3078F PR MOST RECENT DIASTOLIC BLOOD PRESSURE < 80 MM HG: ICD-10-PCS | Mod: CPTII,S$GLB,, | Performed by: NURSE PRACTITIONER

## 2022-10-12 PROCEDURE — 1160F RVW MEDS BY RX/DR IN RCRD: CPT | Mod: CPTII,S$GLB,, | Performed by: NURSE PRACTITIONER

## 2022-10-12 NOTE — PROGRESS NOTES
Anh San presented for a  Medicare AWV and comprehensive Health Risk Assessment today. The following components were reviewed and updated:    Medical history  Family History  Social history  Allergies and Current Medications  Health Risk Assessment  Health Maintenance  Care Team         ** See Completed Assessments for Annual Wellness Visit within the encounter summary.**         The following assessments were completed:  Living Situation  CAGE  Depression Screening  Timed Get Up and Go  Whisper Test  Cognitive Function Screening  Nutrition Screening  ADL Screening  PAQ Screening          Vitals:    10/12/22 1338   BP: 130/78   BP Location: Left arm   Patient Position: Sitting   Pulse: 66   SpO2: 98%   Weight: 49.7 kg (109 lb 9.1 oz)   Height: 5' (1.524 m)     Body mass index is 21.4 kg/m².    Physical Exam  Vitals reviewed.   Constitutional:       Appearance: She is well-developed.   HENT:      Head: Normocephalic and atraumatic. Not macrocephalic and not microcephalic. No raccoon eyes, Arreola's sign, abrasion, contusion, right periorbital erythema, left periorbital erythema or laceration. Hair is normal.      Right Ear: No decreased hearing noted. No laceration, drainage, swelling or tenderness. No middle ear effusion. No foreign body. No mastoid tenderness. No hemotympanum. Tympanic membrane is not injected, scarred, perforated, erythematous, retracted or bulging. Tympanic membrane has normal mobility.      Left Ear: No decreased hearing noted. No laceration, drainage, swelling or tenderness.  No middle ear effusion. No foreign body. No mastoid tenderness. No hemotympanum. Tympanic membrane is not injected, scarred, perforated, erythematous, retracted or bulging. Tympanic membrane has normal mobility.      Nose: Nose normal. No nasal deformity, laceration or mucosal edema.      Mouth/Throat:      Pharynx: Uvula midline.   Eyes:      General: Lids are normal. No scleral icterus.     Conjunctiva/sclera:  Conjunctivae normal.   Neck:      Thyroid: No thyroid mass or thyromegaly.      Trachea: Trachea normal.   Cardiovascular:      Rate and Rhythm: Normal rate and regular rhythm.   Pulmonary:      Effort: Pulmonary effort is normal. No respiratory distress.      Breath sounds: Normal breath sounds.   Abdominal:      Palpations: Abdomen is soft.   Musculoskeletal:         General: Normal range of motion.      Cervical back: Neck supple. No edema or erythema. No spinous process tenderness or muscular tenderness. Normal range of motion.   Lymphadenopathy:      Head:      Right side of head: No submental, submandibular, tonsillar, preauricular or posterior auricular adenopathy.      Left side of head: No submental, submandibular, tonsillar, preauricular, posterior auricular or occipital adenopathy.   Skin:     General: Skin is warm and dry.   Neurological:      Mental Status: She is alert and oriented to person, place, and time.      Cranial Nerves: No cranial nerve deficit.      Sensory: No sensory deficit.   Psychiatric:         Behavior: Behavior normal.         Thought Content: Thought content normal.         Judgment: Judgment normal.           Diagnoses and health risks identified today and associated recommendations/orders:    1. Encounter for preventive health examination  Annual Health Risk Assessment (HRA) visit today.  Counseling and referral of health maintenance and preventative health measures performed.  Patient given annual wellness paperwork to take home.  Encouraged to return in 1 year for subsequent HRA visit.     2. Neutropenia, unspecified type  Chronic. Stable. Continue current treatment plan as previously prescribed by PCP.    3. Localized osteoporosis without current pathological fracture  Chronic. Stable. Continue current treatment plan as previously prescribed by PCP.        Provided Anh with a 5-10 year written screening schedule and personal prevention plan. Recommendations were developed  using the USPSTF age appropriate recommendations. Education, counseling, and referrals were provided as needed. After Visit Summary printed and given to patient which includes a list of additional screenings\tests needed.      I offered to discuss end of life issues, including information on how to make advance directives that the patient could use to name someone who would make medical decisions on their behalf if they became too ill to make themselves.    ___Patient declined  _X_Patient is interested, I provided paper work and offered to discuss.    Follow up in about 1 year (around 10/12/2023).    Anand Cormier NP  I offered to discuss advanced care planning, including how to pick a person who would make decisions for you if you were unable to make them for yourself, called a health care power of , and what kind of decisions you might make such as use of life sustaining treatments such as ventilators and tube feeding when faced with a life limiting illness recorded on a living will that they will need to know. (How you want to be cared for as you near the end of your natural life)     X Patient is interested in learning more about how to make advanced directives.  I provided them paperwork and offered to discuss this with them.

## 2022-10-12 NOTE — PATIENT INSTRUCTIONS
Counseling and Referral of Other Preventative  (Italic type indicates deductible and co-insurance are waived)    Patient Name: Anh San  Today's Date: 10/12/2022    Health Maintenance       Date Due Completion Date    Pneumococcal Vaccines (Age 65+) (1 - PCV) Never done ---    TETANUS VACCINE Never done ---    Mammogram Never done ---    Shingles Vaccine (1 of 2) Never done ---    DEXA Scan Never done ---    Colorectal Cancer Screening Never done ---    Influenza Vaccine (1) Never done ---    COVID-19 Vaccine (5 - Booster for Pfizer series) 09/15/2022 7/21/2022    Lipid Panel 03/21/2027 3/21/2022        No orders of the defined types were placed in this encounter.    The following information is provided to all patients.  This information is to help you find resources for any of the problems found today that may be affecting your health:                Living healthy guide: www.[facility].[place].[facility]      Understanding Diabetes: www.diabetes.org      Eating healthy: www.cdc.gov/healthyweight      [facility] home safety checklist: www.cdc.gov/steadi/patient.html      Agency on Aging: www.goea.[place]      Alcoholics anonymous (AA): www.aa.org      Physical Activity: www.jose.nih.gov/gj0ubxu      Tobacco use: www.quitwithusla.org

## 2022-10-14 ENCOUNTER — PATIENT OUTREACH (OUTPATIENT)
Dept: INTERNAL MEDICINE | Facility: CLINIC | Age: 67
End: 2022-10-14
Payer: MEDICARE

## 2022-10-14 ENCOUNTER — TELEPHONE (OUTPATIENT)
Dept: INTERNAL MEDICINE | Facility: CLINIC | Age: 67
End: 2022-10-14
Payer: MEDICARE

## 2022-10-14 ENCOUNTER — PATIENT MESSAGE (OUTPATIENT)
Dept: INTERNAL MEDICINE | Facility: CLINIC | Age: 67
End: 2022-10-14
Payer: MEDICARE

## 2022-10-14 ENCOUNTER — IMMUNIZATION (OUTPATIENT)
Dept: PHARMACY | Facility: CLINIC | Age: 67
End: 2022-10-14
Payer: MEDICARE

## 2022-10-14 DIAGNOSIS — M81.6 LOCALIZED OSTEOPOROSIS WITHOUT CURRENT PATHOLOGICAL FRACTURE: Primary | ICD-10-CM

## 2022-10-14 DIAGNOSIS — M81.0 AGE-RELATED OSTEOPOROSIS WITHOUT CURRENT PATHOLOGICAL FRACTURE: ICD-10-CM

## 2022-10-14 NOTE — TELEPHONE ENCOUNTER
Patient stated she's trying a treatment on your own and she'll see if that works.  Also patient said she has a blood clot on her left arm. She did an ultra sound in June and wants to know if you can take a look at the ultra sound. She said it's small but thought by now if would have gone away.

## 2022-10-14 NOTE — TELEPHONE ENCOUNTER
----- Message from Darvin Arreola MD sent at 10/14/2022  2:38 PM CDT -----  I recommend pt see endocrinology to discuss treatment for osteoporosis.  She has declined treatment in the past, but I think she should discuss with an expert and perhaps will reconsider given variety of options.    Thanks!

## 2022-12-09 ENCOUNTER — TELEPHONE (OUTPATIENT)
Dept: INTERNAL MEDICINE | Facility: CLINIC | Age: 67
End: 2022-12-09
Payer: MEDICARE

## 2022-12-09 DIAGNOSIS — R22.32 LOCALIZED SWELLING, MASS, OR LUMP OF LEFT UPPER EXTREMITY: Primary | ICD-10-CM

## 2022-12-09 NOTE — TELEPHONE ENCOUNTER
Called pt to schedule the MRI, CMP, and vascular surgery referral that Virgie placed in the chart for her. Went straight to voicemail, left a message. Will send a [facility] message.

## 2022-12-12 ENCOUNTER — TELEPHONE (OUTPATIENT)
Dept: PRIMARY CARE CLINIC | Facility: CLINIC | Age: 67
End: 2022-12-12
Payer: MEDICARE

## 2022-12-12 ENCOUNTER — TELEPHONE (OUTPATIENT)
Dept: INTERNAL MEDICINE | Facility: CLINIC | Age: 67
End: 2022-12-12
Payer: MEDICARE

## 2022-12-12 NOTE — TELEPHONE ENCOUNTER
Informed pt that SUNSHINE Tate recommended that she get an MRI and f/u with vascular. Pt states that she was told 6 months ago that the mass was nothing to worry about and that she did not need to do anything about it and that it would go down on it's own. Pt states that the mass has gone down, but it is still there. Pt also states that she is concerned that this was not looked into 6 months ago and would like Dr. Arreola to make a recommendation if she should do the MRI and see vascular. Pt states that she does not want to do the testing if it is not necessary and would like Dr. Arreola to review her chart.

## 2022-12-12 NOTE — TELEPHONE ENCOUNTER
2nd attempt to contact Ms. San to schedule the MRI, CMP, and vascular surgery referral that Virgie placed in the chart for her. Went straight to voicemail, left a message

## 2022-12-12 NOTE — TELEPHONE ENCOUNTER
----- Message from Jovanna Chin sent at 12/12/2022 10:47 AM CST -----  Type: Patient Call Back        Who called: Self         What is the request in detail: Pt states she would like to speak to a nurse ...         Can the clinic reply by [facility]? No         Would the patient rather a call back or a response via [facility]? Yes         Best call back number: [phone] (home)                 Thank You    
Handled in separate encounter   
[FreeTextEntry1] : Shannan is a 48 premenopausal F with left breast cancer, [id number], ER/OK pos Her 2 neg, anatomic stage IIB, AJCC 8th edition. \par \par Starting in 2019, she started to experience spontaneous nonbloody LEFT nipple discharge.  She did not palpate any abnormal masses in either breast, and denies any breast pain at that time.  Then her discharge recurred several weeks prior, but at this time, she reports feeling a left breast mass that was not painful.  She denies any overlying skin changes, denies palpating any abnormal right breast masses and is currently not experiencing any nipple discharge. \par \par Her work up was as follows:\par 2020 -- b/l dx tomosynthesis\par -heterogenously dense breasts\par -hyperdense mass with calcifications measuring 3.5 cm in superior L breast \par -no suspicious microcalcifications/architectural distortion in R \par b/l US \par RIGHT: \par -@6N4, 1.3 x 1.4 x 0.5 cm oval circumscribed mass --> BIOPSY \par -no axillary lymphadenopathy \par LEFT: \par -@12N2, hypoechoic mass, 6.1 x 2.2 x 5.2 cm with associated calcifications --> BIOPSY \par -no axillary lymphadenopathy\par BIRADS 4\par \par 6/3/2020 -- b/l US CNBx \par -RIGHT @6N4 --> hyalinizing fibroadenoma \par -LEFT @12N2 --> IDC, moderately differentiated, with focal micropapillary and lobular features; DCIS, high nuclear grade, 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, ER/OK pos, Her 2 neg on IHC; Ki67 15%\par \par 2020 -- breast MRI \par -R: no abnormal enhancing mass or suspicious areas of nonmass enhancement \par -L: biopsy proven malignant mass, measures 3.7 x 2.5 x 2.8 cm in central L with extension to the nipple and associated mild nipple retraction; adjacent focus of enhancement, measures <4mm, favored to be part of same process\par -no axillary adenopathy \par \par ONCOTYPE RS: 24 \par \par INVITAE panel genetic testing: negative for any mutations \par \par HISTORICAL RISK FACTORS: \par -no prior breast biopsies or surgeries \par -no family history of breast or ovarian cancer \par -\par -no prior OCP use \par -s/p L salpingectomy for ectopic pregnancy in  \par \par Of note, she has a history of a distal pancreatectomy for a benign tumor. \par \par INTERVAL HISTORY:\par Shannan is a 48 F with a left breast cancer, here for re-discussion of her surgical plan. \par \par Since her last visit she had a breast MRI which estimated her left breast cancer to be 3.7 x 2.5 x 2.8 cm with involvement of her nipple and an adjacent foci of enhancement measuring <4mm, favored to be part of the same process, no lymphadenopathy and no right sided abnormalities.  \par \par She also had an ONCOTYPE sent on her biopsy specimen, which was 24. \par She also had INVITAE panel genetic testing which did not reveal any genetic mutations. \par \par She has no new breast related complaints.

## 2022-12-12 NOTE — TELEPHONE ENCOUNTER
----- Message from Jovanna Chin sent at 12/12/2022 11:38 AM CST -----  Type:  Patient Returning Call    Who Called: self     Who Left Message for Patient: JENNIFER ARCHER    Does the patient know what this is regarding?:yes     Would the patient rather a call back or a response via [facility]? Yes     Best Call Back Number: [phone] (home)

## 2022-12-12 NOTE — TELEPHONE ENCOUNTER
----- Message from Virgie Tate PA-C sent at 12/9/2022  4:19 PM CST -----  Chart check shows that pt still has palpable mass on forearm -recommend MRI with contrast for further eval. Will also place vascular referral. Please call patient to help with scheduling. Thanks [facility]

## 2022-12-12 NOTE — TELEPHONE ENCOUNTER
Spoke with pt    Ok to wait on MRI. Will see in office soon.    Concerns about bilat hand swelling occasional intermittent    JL

## 2022-12-14 ENCOUNTER — PATIENT MESSAGE (OUTPATIENT)
Dept: INTERNAL MEDICINE | Facility: CLINIC | Age: 67
End: 2022-12-14
Payer: MEDICARE

## 2023-01-18 ENCOUNTER — PATIENT MESSAGE (OUTPATIENT)
Dept: ADMINISTRATIVE | Facility: HOSPITAL | Age: 68
End: 2023-01-18
Payer: MEDICARE

## 2023-02-11 ENCOUNTER — HOSPITAL ENCOUNTER (EMERGENCY)
Facility: OTHER | Age: 68
Discharge: HOME | End: 2023-02-11
Attending: EMERGENCY MEDICINE
Payer: MEDICARE

## 2023-02-11 VITALS
OXYGEN SATURATION: 98 % | WEIGHT: 107 LBS | BODY MASS INDEX: 21.01 KG/M2 | HEIGHT: 60 IN | TEMPERATURE: 98 F | DIASTOLIC BLOOD PRESSURE: 82 MMHG | RESPIRATION RATE: 16 BRPM | SYSTOLIC BLOOD PRESSURE: 147 MMHG | HEART RATE: 74 BPM
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
## 2023-02-11 PROCEDURE — 90715 TDAP VACCINE 7 YRS/> IM: CPT | Performed by: EMERGENCY MEDICINE

## 2023-02-11 PROCEDURE — 63600175 PHARM REV CODE 636 W HCPCS: Performed by: EMERGENCY MEDICINE

## 2023-02-11 PROCEDURE — 25000003 PHARM REV CODE 250: Performed by: EMERGENCY MEDICINE

## 2023-02-11 PROCEDURE — 99284 EMERGENCY DEPT VISIT MOD MDM: CPT | Mod: 25

## 2023-02-11 PROCEDURE — 90471 IMMUNIZATION ADMIN: CPT | Performed by: EMERGENCY MEDICINE

## 2023-02-11 RX ORDER — AMOXICILLIN AND CLAVULANATE POTASSIUM 875; 125 MG/1; MG/1
1 TABLET, FILM COATED ORAL
Status: COMPLETED | OUTPATIENT
Start: 2023-02-11 | End: 2023-02-11

## 2023-02-11 RX ORDER — AMOXICILLIN AND CLAVULANATE POTASSIUM 875; 125 MG/1; MG/1
1 TABLET, FILM COATED ORAL 2 TIMES DAILY
Qty: 14 TABLET | Refills: 0 | Status: SHIPPED | OUTPATIENT
Start: 2023-02-11 | End: 2024-03-21

## 2023-02-11 RX ADMIN — CLOSTRIDIUM TETANI TOXOID ANTIGEN (FORMALDEHYDE INACTIVATED), CORYNEBACTERIUM DIPHTHERIAE TOXOID ANTIGEN (FORMALDEHYDE INACTIVATED), BORDETELLA PERTUSSIS TOXOID ANTIGEN (GLUTARALDEHYDE INACTIVATED), BORDETELLA PERTUSSIS FILAMENTOUS HEMAGGLUTININ ANTIGEN (FORMALDEHYDE INACTIVATED), BORDETELLA PERTUSSIS PERTACTIN ANTIGEN, AND BORDETELLA PERTUSSIS FIMBRIAE 2/3 ANTIGEN 0.5 ML: 5; 2; 2.5; 5; 3; 5 INJECTION, SUSPENSION INTRAMUSCULAR at 09:02

## 2023-02-11 RX ADMIN — AMOXICILLIN AND CLAVULANATE POTASSIUM 1 TABLET: 875; 125 TABLET, FILM COATED ORAL at 09:02

## 2023-02-12 NOTE — ED PROVIDER NOTES
Encounter Date: 2023    SCRIBE #1 NOTE: I, Daysi Melendez, am scribing for, and in the presence of,  Kriss Yusuf MD. I have scribed the following portions of the note - Other sections scribed: HPI, ROS, PE.     History     Chief Complaint   Patient presents with    Animal Bite     X 1.5 hrs, dog has had shots     Time seen by provider: 9:35 PM    This is a 67 y.o. female who presents s/p animal bite 2.5 hours ago. She states that she was bitten by a friend's dog on her left buttocks over her clothing. She reports that the dog is up to date on all vaccinations, but she herself is not up to date on TDAP vaccine. She states that there was minimal bleeding. She reports that she applied betadine and antiseptic to the area PTA. This is the extent of the patient's complaints at this time.    The history is provided by the patient.   Review of patient's allergies indicates:  No Known Allergies  History reviewed. No pertinent past medical history.  Past Surgical History:   Procedure Laterality Date    ADENOIDECTOMY      bladder prolapse surgery      carpel tunnel repair       SECTION      x1     FRACTURE SURGERY      HERNIA REPAIR      TONSILLECTOMY       Family History   Problem Relation Age of Onset    Multiple sclerosis Mother         late onset    Hypertension Father     Heart disease Sister     Hypertension Sister     Coronary artery disease Sister         stents    Heart attack Sister     Heart attacks under age 50 Sister     HIV Brother     Depression Son     Anxiety disorder Son     No Known Problems Son     Anxiety disorder Son     No Known Problems Son     Stroke Maternal Grandmother     Colon cancer Neg Hx     Breast cancer Neg Hx     Ovarian cancer Neg Hx      Social History     Tobacco Use    Smoking status: Never    Smokeless tobacco: Never   Substance Use Topics    Alcohol use: Not Currently    Drug use: Never     Review of Systems   Constitutional:  Negative for chills and fever.    Musculoskeletal:  Negative for back pain and neck pain.   Skin:  Positive for wound. Negative for color change and rash.   Neurological:  Negative for dizziness and headaches.   All other systems reviewed and are negative.    Physical Exam     Initial Vitals [02/11/23 2123]   BP Pulse Resp Temp SpO2   (!) 158/75 74 16 97.6 °F (36.4 °C) 98 %      MAP       --         Physical Exam    Nursing note and vitals reviewed.  Constitutional: She appears well-developed and well-nourished. No distress.   HENT:   Head: Normocephalic and atraumatic.   Eyes: Conjunctivae are normal.   Pulmonary/Chest: No respiratory distress.   Musculoskeletal:         General: Normal range of motion.     Neurological: She is alert and oriented to person, place, and time.   Skin: Skin is warm and dry. Capillary refill takes less than 2 seconds.   Oval-shaped pattern of superficial abrasions and scratches. No clear puncture wounds noted. No active bleeding noted.    Psychiatric: She has a normal mood and affect.       ED Course   Procedures  Labs Reviewed - No data to display       Imaging Results    None          Medications   amoxicillin-clavulanate 875-125mg per tablet 1 tablet (1 tablet Oral Given 2/11/23 2155)   Tdap vaccine injection 0.5 mL (0.5 mLs Intramuscular Given 2/11/23 2155)     Medical Decision Making:   History:   Old Medical Records: I decided to obtain old medical records.  Old Records Summarized: other records and records from clinic visits.  Initial Assessment:   9:35PM:  Patient is a 67-year-old female who presents to the emergency room after an animal bite to her buttock.  Patient appears well, nontoxic.  On exam she has an oval shaped pattern of multiple superficial abrasions and scratches, with no clear puncture wound though these may have closed up prior to her arrival.  She did already irrigated appropriately and cleaned with Betadine.  Will plan to initiate antibiotics along with a tetanus shot.  I counseled the  patient regarding wound care management.  I do not feel that further work up in the ED is indicated at this time.   I counseled pt regarding supportive care measures.  I have discussed with the pt ED return warnings and need for close PCP f/u.  Pt agreeable to plan and all questions answered.  I feel that pt is stable for discharge and management as an outpatient and no further intervention is needed at this time.  Pt is comfortable returning to the ED if needed.  Will DC home in stable condition.          Scribe Attestation:   Scribe #1: I performed the above scribed service and the documentation accurately describes the services I performed. I attest to the accuracy of the note.            Physician Attestation for Scribe: I, Kriss Yusuf, reviewed documentation as scribed in my presence, which is both accurate and complete.         Clinical Impression:   Final diagnoses:  [W54.0XXA] Dog bite, initial encounter (Primary)        ED Disposition Condition    Discharge Stable          ED Prescriptions       Medication Sig Dispense Start Date End Date Auth. Provider    amoxicillin-clavulanate 875-125mg (AUGMENTIN) 875-125 mg per tablet Take 1 tablet by mouth 2 (two) times daily. 14 tablet 2/11/2023 -- Kriss Yusuf MD          Follow-up Information       Follow up With Specialties Details Why Contact Info    Darvin Arreola MD Family Medicine   [address] [address]  [phone]               Kriss Yusuf MD  02/12/23 0002

## 2023-02-14 ENCOUNTER — OFFICE VISIT (OUTPATIENT)
Dept: CARDIOLOGY | Facility: CLINIC | Age: 68
End: 2023-02-14
Payer: MEDICARE

## 2023-02-14 VITALS
HEART RATE: 68 BPM | BODY MASS INDEX: 21.56 KG/M2 | WEIGHT: 109.81 LBS | RESPIRATION RATE: 15 BRPM | OXYGEN SATURATION: 98 % | DIASTOLIC BLOOD PRESSURE: 74 MMHG | HEIGHT: 60 IN | SYSTOLIC BLOOD PRESSURE: 132 MMHG

## 2023-02-14 DIAGNOSIS — I10 HYPERTENSION, UNSPECIFIED TYPE: Primary | ICD-10-CM

## 2023-02-14 DIAGNOSIS — R22.32 ARM MASS, LEFT: ICD-10-CM

## 2023-02-14 DIAGNOSIS — R22.32 MASS OF ARM, LEFT: ICD-10-CM

## 2023-02-14 PROCEDURE — 1126F AMNT PAIN NOTED NONE PRSNT: CPT | Mod: CPTII,S$GLB,, | Performed by: INTERNAL MEDICINE

## 2023-02-14 PROCEDURE — 99204 OFFICE O/P NEW MOD 45 MIN: CPT | Mod: S$GLB,,, | Performed by: INTERNAL MEDICINE

## 2023-02-14 PROCEDURE — 1126F PR PAIN SEVERITY QUANTIFIED, NO PAIN PRESENT: ICD-10-PCS | Mod: CPTII,S$GLB,, | Performed by: INTERNAL MEDICINE

## 2023-02-14 PROCEDURE — 1159F PR MEDICATION LIST DOCUMENTED IN MEDICAL RECORD: ICD-10-PCS | Mod: CPTII,S$GLB,, | Performed by: INTERNAL MEDICINE

## 2023-02-14 PROCEDURE — 3288F FALL RISK ASSESSMENT DOCD: CPT | Mod: CPTII,S$GLB,, | Performed by: INTERNAL MEDICINE

## 2023-02-14 PROCEDURE — 1160F PR REVIEW ALL MEDS BY PRESCRIBER/CLIN PHARMACIST DOCUMENTED: ICD-10-PCS | Mod: CPTII,S$GLB,, | Performed by: INTERNAL MEDICINE

## 2023-02-14 PROCEDURE — 1101F PR PT FALLS ASSESS DOC 0-1 FALLS W/OUT INJ PAST YR: ICD-10-PCS | Mod: CPTII,S$GLB,, | Performed by: INTERNAL MEDICINE

## 2023-02-14 PROCEDURE — 3288F PR FALLS RISK ASSESSMENT DOCUMENTED: ICD-10-PCS | Mod: CPTII,S$GLB,, | Performed by: INTERNAL MEDICINE

## 2023-02-14 PROCEDURE — 99999 PR PBB SHADOW E&M-EST. PATIENT-LVL III: CPT | Mod: PBBFAC,,, | Performed by: INTERNAL MEDICINE

## 2023-02-14 PROCEDURE — 1160F RVW MEDS BY RX/DR IN RCRD: CPT | Mod: CPTII,S$GLB,, | Performed by: INTERNAL MEDICINE

## 2023-02-14 PROCEDURE — 1101F PT FALLS ASSESS-DOCD LE1/YR: CPT | Mod: CPTII,S$GLB,, | Performed by: INTERNAL MEDICINE

## 2023-02-14 PROCEDURE — 3078F PR MOST RECENT DIASTOLIC BLOOD PRESSURE < 80 MM HG: ICD-10-PCS | Mod: CPTII,S$GLB,, | Performed by: INTERNAL MEDICINE

## 2023-02-14 PROCEDURE — 3075F PR MOST RECENT SYSTOLIC BLOOD PRESS GE 130-139MM HG: ICD-10-PCS | Mod: CPTII,S$GLB,, | Performed by: INTERNAL MEDICINE

## 2023-02-14 PROCEDURE — 93000 EKG 12-LEAD: ICD-10-PCS | Mod: S$GLB,,, | Performed by: INTERNAL MEDICINE

## 2023-02-14 PROCEDURE — 3078F DIAST BP <80 MM HG: CPT | Mod: CPTII,S$GLB,, | Performed by: INTERNAL MEDICINE

## 2023-02-14 PROCEDURE — 93000 ELECTROCARDIOGRAM COMPLETE: CPT | Mod: S$GLB,,, | Performed by: INTERNAL MEDICINE

## 2023-02-14 PROCEDURE — 3008F BODY MASS INDEX DOCD: CPT | Mod: CPTII,S$GLB,, | Performed by: INTERNAL MEDICINE

## 2023-02-14 PROCEDURE — 99999 PR PBB SHADOW E&M-EST. PATIENT-LVL III: ICD-10-PCS | Mod: PBBFAC,,, | Performed by: INTERNAL MEDICINE

## 2023-02-14 PROCEDURE — 3075F SYST BP GE 130 - 139MM HG: CPT | Mod: CPTII,S$GLB,, | Performed by: INTERNAL MEDICINE

## 2023-02-14 PROCEDURE — 99204 PR OFFICE/OUTPT VISIT, NEW, LEVL IV, 45-59 MIN: ICD-10-PCS | Mod: S$GLB,,, | Performed by: INTERNAL MEDICINE

## 2023-02-14 PROCEDURE — 1159F MED LIST DOCD IN RCRD: CPT | Mod: CPTII,S$GLB,, | Performed by: INTERNAL MEDICINE

## 2023-02-14 PROCEDURE — 3008F PR BODY MASS INDEX (BMI) DOCUMENTED: ICD-10-PCS | Mod: CPTII,S$GLB,, | Performed by: INTERNAL MEDICINE

## 2023-02-14 NOTE — PROGRESS NOTES
CARDIOVASCULAR CONSULTATION    REASON FOR CONSULT:   Anh San is a 67 y.o. female who presents for evaluation    HISTORY OF PRESENT ILLNESS:     Patient is a pleasant 67-year-old lady.  Here for establishing care with Cardiology.  Denies any chest pains at rest on exertion, orthopnea, PND.  Family history of hypertension.  In her left forearm days a small mobile mass.  She is scheduled to get an MRI for that, but has not gotten it done.  She states that she has seen it for the past 6 months at it has stayed stable in size.      Ultrasound arm:      FINDINGS:  Volar aspect of the left wrist: Oval, circumscribed focus measures 4 x 2 mm.  Homogeneous internal echoes.  Internal vascularity documented on color and Doppler images. Lesion is intimately associated with adjacent superficial vessels and appears intraluminal on several images.  Imaging features are nonspecific.  It is possible this represents partially occlusive thrombosis of a superficial vein.  If the abnormality persists or enlarges, correlation with MRI may be considered in further evaluating.     Volar aspect of the forearm: No abnormality identified.              PAST MEDICAL HISTORY:   No past medical history on file.    PAST SURGICAL HISTORY:     Past Surgical History:   Procedure Laterality Date    ADENOIDECTOMY      bladder prolapse surgery      carpel tunnel repair       SECTION      x1     FRACTURE SURGERY      HERNIA REPAIR      TONSILLECTOMY         ALLERGIES AND MEDICATION:   Review of patient's allergies indicates:  No Known Allergies     Medication List            Accurate as of 2023  9:28 AM. If you have any questions, ask your nurse or doctor.                CONTINUE taking these medications      amoxicillin-clavulanate 875-125mg 875-125 mg per tablet  Commonly known as: AUGMENTIN  Take 1 tablet by mouth 2 (two) times daily.              SOCIAL HISTORY:     Social History     Socioeconomic History     Marital status: Single   Tobacco Use    Smoking status: Never    Smokeless tobacco: Never   Substance and Sexual Activity    Alcohol use: Not Currently    Drug use: Never    Sexual activity: Not Currently     Partners: Male     Social Determinants of Health     Financial Resource Strain: Low Risk     Difficulty of Paying Living Expenses: Not hard at all   Food Insecurity: No Food Insecurity    Worried About Running Out of Food in the Last Year: Never true    Ran Out of Food in the Last Year: Never true   Transportation Needs: No Transportation Needs    Lack of Transportation (Medical): No    Lack of Transportation (Non-Medical): No   Physical Activity: Insufficiently Active    Days of Exercise per Week: 7 days    Minutes of Exercise per Session: 20 min   Stress: Stress Concern Present    Feeling of Stress : Rather much   Social Connections: Socially Isolated    Frequency of Communication with Friends and Family: More than three times a week    Frequency of Social Gatherings with Friends and Family: More than three times a week    Attends Yazidi Services: Never    Active Member of Clubs or Organizations: No    Attends Club or Organization Meetings: Never    Marital Status:    Housing Stability: Unknown    Unable to Pay for Housing in the Last Year: No    Unstable Housing in the Last Year: No       FAMILY HISTORY:     Family History   Problem Relation Age of Onset    Multiple sclerosis Mother         late onset    Hypertension Father     Heart disease Sister     Hypertension Sister     Coronary artery disease Sister         stents    Heart attack Sister     Heart attacks under age 50 Sister     HIV Brother     Depression Son     Anxiety disorder Son     No Known Problems Son     Anxiety disorder Son     No Known Problems Son     Stroke Maternal Grandmother     Colon cancer Neg Hx     Breast cancer Neg Hx     Ovarian cancer Neg Hx        REVIEW OF SYSTEMS:   Review of Systems   Constitutional: Negative.    HENT: Negative.     Eyes: Negative.    Cardiovascular: Negative.    Respiratory: Negative.     Endocrine: Negative.    Hematologic/Lymphatic: Negative.    Skin: Negative.    Musculoskeletal: Negative.    Gastrointestinal: Negative.    Genitourinary: Negative.    Neurological: Negative.    Psychiatric/Behavioral: Negative.     Allergic/Immunologic: Negative.      A 10 point review of systems was performed and all the pertinent positives have been mentioned. Rest of review of systems was negative.        PHYSICAL EXAM:     Vitals:    02/14/23 0909   BP: 132/74   Pulse: 68   Resp: 15    Body mass index is 21.44 kg/m².  Weight: 49.8 kg (109 lb 12.6 oz)   Height: 5' (152.4 cm)     Physical Exam  Constitutional:       Appearance: Normal appearance. She is well-developed.   HENT:      Head: Normocephalic.   Eyes:      Pupils: Pupils are equal, round, and reactive to light.   Cardiovascular:      Rate and Rhythm: Normal rate and regular rhythm.   Pulmonary:      Effort: Pulmonary effort is normal.      Breath sounds: Normal breath sounds.   Abdominal:      General: Bowel sounds are normal.      Palpations: Abdomen is soft.      Tenderness: There is no abdominal tenderness.   Musculoskeletal:         General: Normal range of motion.      Cervical back: Normal range of motion and neck supple.   Skin:     General: Skin is warm.   Neurological:      Mental Status: She is alert and oriented to person, place, and time.         DATA:     Laboratory:  CBC:  Recent Labs   Lab 03/21/22  1305 06/27/22  1145   WBC 3.74 L 3.25 L   Hemoglobin 13.9 13.9   Hematocrit 39.8 40.6   Platelets 232 235       CHEMISTRIES:  Recent Labs   Lab 03/21/22  1305   Glucose 90   Sodium 140   Potassium 4.2   BUN 14   Creatinine 0.7   eGFR if  >60   eGFR if non African American >60   Calcium 9.3       CARDIAC BIOMARKERS:        COAGS:        LIPIDS/LFTS:  Recent Labs   Lab 03/21/22  1305   Cholesterol 199   Triglycerides 55   HDL 83 H    LDL Cholesterol 105.0   Non-HDL Cholesterol 116   AST 17   ALT 12       No results found for: HGBA1C    TSH  Recent Labs   Lab 03/21/22  1305   TSH 1.280       The 10-year ASCVD risk score (Juno FINCH, et al., 2019) is: 5.6%    Values used to calculate the score:      Age: 67 years      Sex: Female      Is Non- : No      Diabetic: No      Tobacco smoker: No      Systolic Blood Pressure: 132 mmHg      Is BP treated: No      HDL Cholesterol: 83 mg/dL      Total Cholesterol: 199 mg/dL             ASSESSMENT AND PLAN     Patient Active Problem List   Diagnosis    Localized osteoporosis without current pathological fracture    Neutropenia    Arm mass, left       Patient with small left arm mass.  Ultrasound suggest that it might be intraluminal and represent a partially occlusive thrombus.  Because of the small size and the superficial vein, no indication for anticoagulation at the current time.  Agree with getting an MRI of her arm to further define it.  Patient states that it has been stable in size.    Get 2D echo.      Fu in 3 m                Thank you very much for involving me in the care of your patient.  Please do not hesitate to contact me if there are any questions.      Karen Acevedo MD, FACC, [facility]  Interventional Cardiologist, [facility].           This note was dictated with the help of speech recognition software.  There might be un-intended errors and/or substitutions.

## 2023-02-17 ENCOUNTER — HOSPITAL ENCOUNTER (OUTPATIENT)
Dept: CARDIOLOGY | Facility: OTHER | Age: 68
Discharge: HOME | End: 2023-02-17
Attending: INTERNAL MEDICINE
Payer: MEDICARE

## 2023-02-17 ENCOUNTER — HOSPITAL ENCOUNTER (OUTPATIENT)
Dept: RADIOLOGY | Facility: OTHER | Age: 68
Discharge: HOME | End: 2023-02-17
Attending: PHYSICIAN ASSISTANT
Payer: MEDICARE

## 2023-02-17 VITALS
WEIGHT: 109 LBS | HEIGHT: 60 IN | BODY MASS INDEX: 21.4 KG/M2 | HEART RATE: 68 BPM | SYSTOLIC BLOOD PRESSURE: 132 MMHG | DIASTOLIC BLOOD PRESSURE: 74 MMHG

## 2023-02-17 DIAGNOSIS — R22.32 LOCALIZED SWELLING, MASS, OR LUMP OF LEFT UPPER EXTREMITY: ICD-10-CM

## 2023-02-17 DIAGNOSIS — R22.32 MASS OF ARM, LEFT: ICD-10-CM

## 2023-02-17 LAB
ASCENDING AORTA: 2.34 CM
AV INDEX (PROSTH): 1.17
AV MEAN GRADIENT: 3 MMHG
AV PEAK GRADIENT: 4 MMHG
AV VALVE AREA: 3.29 CM2
AV VELOCITY RATIO: 0.94
BSA FOR ECHO PROCEDURE: 1.45 M2
CV ECHO LV RWT: 0.36 CM
DOP CALC AO PEAK VEL: 1.04 M/S
DOP CALC AO VTI: 20.1 CM
DOP CALC LVOT AREA: 2.8 CM2
DOP CALC LVOT DIAMETER: 1.89 CM
DOP CALC LVOT PEAK VEL: 0.98 M/S
DOP CALC LVOT STROKE VOLUME: 66.18 CM3
DOP CALCLVOT PEAK VEL VTI: 23.6 CM
E WAVE DECELERATION TIME: 199.02 MSEC
E/A RATIO: 1.07
E/E' RATIO: 7.18 M/S
ECHO LV POSTERIOR WALL: 0.83 CM (ref 0.6–1.1)
EJECTION FRACTION: 55 %
INTERVENTRICULAR SEPTUM: 1.86 CM (ref 0.6–1.1)
IVC DIAMETER: 15 CM
IVRT: 95.15 MSEC
LA MAJOR: 4.62 CM
LA MINOR: 4.61 CM
LA WIDTH: 3.3 CM
LEFT ATRIUM SIZE: 3.6 CM
LEFT ATRIUM VOLUME INDEX MOD: 27.1 ML/M2
LEFT ATRIUM VOLUME INDEX: 32.4 ML/M2
LEFT ATRIUM VOLUME MOD: 39 CM3
LEFT ATRIUM VOLUME: 46.6 CM3
LEFT VENTRICLE DIASTOLIC VOLUME INDEX: 68.56 ML/M2
LEFT VENTRICLE DIASTOLIC VOLUME: 98.72 ML
LEFT VENTRICLE MASS INDEX: 170 G/M2
LEFT VENTRICULAR INTERNAL DIMENSION IN DIASTOLE: 4.63 CM (ref 3.5–6)
LEFT VENTRICULAR MASS: 244.35 G
LV LATERAL E/E' RATIO: 6.78 M/S
LV SEPTAL E/E' RATIO: 7.63 M/S
LVOT MG: 2.18 MMHG
LVOT MV: 0.68 CM/S
MV PEAK A VEL: 0.57 M/S
MV PEAK E VEL: 0.61 M/S
MV STENOSIS PRESSURE HALF TIME: 51.31 MS
MV VALVE AREA P 1/2 METHOD: 4.29 CM2
PISA MRMAX VEL: 4.01 M/S
PISA TR MAX VEL: 2.39 M/S
PV MV: 0.79 M/S
PV PEAK VELOCITY: 0.91 CM/S
RA MAJOR: 3.88 CM
RA WIDTH: 3 CM
STJ: 2.57 CM
TDI LATERAL: 0.09 M/S
TDI SEPTAL: 0.08 M/S
TDI: 0.09 M/S
TR MAX PG: 23 MMHG

## 2023-02-17 PROCEDURE — 93306 ECHO (CUPID ONLY): ICD-10-PCS | Mod: 26,,, | Performed by: INTERNAL MEDICINE

## 2023-02-17 PROCEDURE — 73221 MRI JOINT UPR EXTREM W/O DYE: CPT | Mod: TC,LT

## 2023-02-17 PROCEDURE — 73220 MRI FOREARM W WO CONTRAST LEFT: ICD-10-PCS | Mod: 26,LT,, | Performed by: RADIOLOGY

## 2023-02-17 PROCEDURE — 93306 TTE W/DOPPLER COMPLETE: CPT | Mod: 26,,, | Performed by: INTERNAL MEDICINE

## 2023-02-17 PROCEDURE — 73220 MRI UPPR EXTREMITY W/O&W/DYE: CPT | Mod: 26,LT,, | Performed by: RADIOLOGY

## 2023-02-17 PROCEDURE — A9585 GADOBUTROL INJECTION: HCPCS | Performed by: PHYSICIAN ASSISTANT

## 2023-02-17 PROCEDURE — 25500020 PHARM REV CODE 255: Performed by: PHYSICIAN ASSISTANT

## 2023-02-17 PROCEDURE — 93306 TTE W/DOPPLER COMPLETE: CPT

## 2023-02-17 RX ORDER — GADOBUTROL 604.72 MG/ML
5 INJECTION INTRAVENOUS
Status: COMPLETED | OUTPATIENT
Start: 2023-02-17 | End: 2023-02-17

## 2023-02-17 RX ADMIN — GADOBUTROL 5 ML: 604.72 INJECTION INTRAVENOUS at 05:02

## 2023-02-24 ENCOUNTER — OFFICE VISIT (OUTPATIENT)
Dept: CARDIOLOGY | Facility: CLINIC | Age: 68
End: 2023-02-24
Payer: MEDICARE

## 2023-02-24 DIAGNOSIS — R22.32 MASS OF ARM, LEFT: Primary | ICD-10-CM

## 2023-02-24 PROCEDURE — 1160F PR REVIEW ALL MEDS BY PRESCRIBER/CLIN PHARMACIST DOCUMENTED: ICD-10-PCS | Mod: CPTII,95,, | Performed by: INTERNAL MEDICINE

## 2023-02-24 PROCEDURE — 99443 PR PHYSICIAN TELEPHONE EVALUATION 21-30 MIN: CPT | Mod: 95,,, | Performed by: INTERNAL MEDICINE

## 2023-02-24 PROCEDURE — 99443 PR PHYSICIAN TELEPHONE EVALUATION 21-30 MIN: ICD-10-PCS | Mod: 95,,, | Performed by: INTERNAL MEDICINE

## 2023-02-24 PROCEDURE — 1159F MED LIST DOCD IN RCRD: CPT | Mod: CPTII,95,, | Performed by: INTERNAL MEDICINE

## 2023-02-24 PROCEDURE — 1160F RVW MEDS BY RX/DR IN RCRD: CPT | Mod: CPTII,95,, | Performed by: INTERNAL MEDICINE

## 2023-02-24 PROCEDURE — 1159F PR MEDICATION LIST DOCUMENTED IN MEDICAL RECORD: ICD-10-PCS | Mod: CPTII,95,, | Performed by: INTERNAL MEDICINE

## 2023-02-24 NOTE — PROGRESS NOTES
CARDIOVASCULAR CONSULTATION    REASON FOR CONSULT:   Anh San is a 67 y.o. female who presents for evaluation    HISTORY OF PRESENT ILLNESS:     Patient is a pleasant 67-year-old lady.  Here for establishing care with Cardiology.  Denies any chest pains at rest on exertion, orthopnea, PND.  Family history of hypertension.  In her left forearm days a small mobile mass.  She is scheduled to get an MRI for that, but has not gotten it done.  She states that she has seen it for the past 6 months at it has stayed stable in size.      Ultrasound arm:      FINDINGS:  Volar aspect of the left wrist: Oval, circumscribed focus measures 4 x 2 mm.  Homogeneous internal echoes.  Internal vascularity documented on color and Doppler images. Lesion is intimately associated with adjacent superficial vessels and appears intraluminal on several images.  Imaging features are nonspecific.  It is possible this represents partially occlusive thrombosis of a superficial vein.  If the abnormality persists or enlarges, correlation with MRI may be considered in further evaluating.     Volar aspect of the forearm: No abnormality identified.     The patient location is: her home  The chief complaint leading to consultation is: fu    Visit type: audiovisual    Face to Face time with patient: 15 min  25 minutes of total time spent on the encounter, which includes face to face time and non-face to face time preparing to see the patient (eg, review of tests), Obtaining and/or reviewing separately obtained history, Documenting clinical information in the electronic or other health record, Independently interpreting results (not separately reported) and communicating results to the patient/family/caregiver, or Care coordination (not separately reported).         Each patient to whom he or she provides medical services by telemedicine is:  (1) informed of the relationship between the physician and patient and the respective role of  any other health care provider with respect to management of the patient; and (2) notified that he or she may decline to receive medical services by telemedicine and may withdraw from such care at any time.    Notes:     Patient doing fine.  Denies any chest pains at rest on exertion, orthopnea, PND.  Results of the tests discussed in detail with the patient     The left ventricle is normal in size with concentric remodeling and normal systolic function.  The estimated ejection fraction is 55%.  Indeterminate left ventricular diastolic function.  Normal right ventricular size with normal right ventricular systolic function.  Mild mitral regurgitation.  Mild tricuspid regurgitation.      Impression:     Corresponding with palpable abnormality (and cardiovascular evaluation including ultrasound) is a 5 mm rounded structure, contiguous with linear tubular structures suggesting vascular etiology.  Venous varix would be a leading consideration.  Chronic thrombophlebitis not excluded.  No definite filling defects.               PAST MEDICAL HISTORY:   No past medical history on file.    PAST SURGICAL HISTORY:     Past Surgical History:   Procedure Laterality Date    ADENOIDECTOMY      bladder prolapse surgery      carpel tunnel repair       SECTION      x1 1978    FRACTURE SURGERY      HERNIA REPAIR      TONSILLECTOMY         ALLERGIES AND MEDICATION:   Review of patient's allergies indicates:  No Known Allergies     Medication List            Accurate as of 2023  8:55 AM. If you have any questions, ask your nurse or doctor.                CONTINUE taking these medications      amoxicillin-clavulanate 875-125mg 875-125 mg per tablet  Commonly known as: AUGMENTIN  Take 1 tablet by mouth 2 (two) times daily.              SOCIAL HISTORY:     Social History     Socioeconomic History    Marital status: Single   Tobacco Use    Smoking status: Never    Smokeless tobacco: Never   Substance and Sexual Activity     Alcohol use: Not Currently    Drug use: Never    Sexual activity: Not Currently     Partners: Male     Social Determinants of Health     Financial Resource Strain: Low Risk     Difficulty of Paying Living Expenses: Not hard at all   Food Insecurity: No Food Insecurity    Worried About Running Out of Food in the Last Year: Never true    Ran Out of Food in the Last Year: Never true   Transportation Needs: No Transportation Needs    Lack of Transportation (Medical): No    Lack of Transportation (Non-Medical): No   Physical Activity: Insufficiently Active    Days of Exercise per Week: 7 days    Minutes of Exercise per Session: 20 min   Stress: Stress Concern Present    Feeling of Stress : Rather much   Social Connections: Socially Isolated    Frequency of Communication with Friends and Family: More than three times a week    Frequency of Social Gatherings with Friends and Family: More than three times a week    Attends Adventism Services: Never    Active Member of Clubs or Organizations: No    Attends Club or Organization Meetings: Never    Marital Status:    Housing Stability: Unknown    Unable to Pay for Housing in the Last Year: No    Unstable Housing in the Last Year: No       FAMILY HISTORY:     Family History   Problem Relation Age of Onset    Multiple sclerosis Mother         late onset    Hypertension Father     Heart disease Sister     Hypertension Sister     Coronary artery disease Sister         stents    Heart attack Sister     Heart attacks under age 50 Sister     HIV Brother     Depression Son     Anxiety disorder Son     No Known Problems Son     Anxiety disorder Son     No Known Problems Son     Stroke Maternal Grandmother     Colon cancer Neg Hx     Breast cancer Neg Hx     Ovarian cancer Neg Hx        REVIEW OF SYSTEMS:   Review of Systems   Constitutional: Negative.   HENT: Negative.     Eyes: Negative.    Cardiovascular: Negative.    Respiratory: Negative.     Endocrine: Negative.     Hematologic/Lymphatic: Negative.    Skin: Negative.    Musculoskeletal: Negative.    Gastrointestinal: Negative.    Genitourinary: Negative.    Neurological: Negative.    Psychiatric/Behavioral: Negative.     Allergic/Immunologic: Negative.      A 10 point review of systems was performed and all the pertinent positives have been mentioned. Rest of review of systems was negative.        PHYSICAL EXAM:     There were no vitals filed for this visit.   There is no height or weight on file to calculate BMI.            Physical Exam      DATA:     Laboratory:  CBC:  Recent Labs   Lab 03/21/22  1305 06/27/22  1145   WBC 3.74 L 3.25 L   Hemoglobin 13.9 13.9   Hematocrit 39.8 40.6   Platelets 232 235         CHEMISTRIES:  Recent Labs   Lab 03/21/22  1305 02/17/23  1533   Glucose 90 113 H   Sodium 140 143   Potassium 4.2 3.9   BUN 14 15   Creatinine 0.7 0.7   eGFR if  >60  --    eGFR if non African American >60  --    Calcium 9.3 9.1         CARDIAC BIOMARKERS:        COAGS:        LIPIDS/LFTS:  Recent Labs   Lab 03/21/22  1305 02/17/23  1533   Cholesterol 199  --    Triglycerides 55  --    HDL 83 H  --    LDL Cholesterol 105.0  --    Non-HDL Cholesterol 116  --    AST 17 18   ALT 12 15         No results found for: HGBA1C    TSH  Recent Labs   Lab 03/21/22  1305   TSH 1.280         The 10-year ASCVD risk score (Juno FINCH, et al., 2019) is: 5.6%    Values used to calculate the score:      Age: 67 years      Sex: Female      Is Non- : No      Diabetic: No      Tobacco smoker: No      Systolic Blood Pressure: 132 mmHg      Is BP treated: No      HDL Cholesterol: 83 mg/dL      Total Cholesterol: 199 mg/dL             ASSESSMENT AND PLAN     Patient Active Problem List   Diagnosis    Localized osteoporosis without current pathological fracture    Neutropenia    Arm mass, left       Patient with small left arm mass.  Ultrasound suggest that it might be intraluminal and represent a  partially occlusive thrombus.  Because of the small size and the superficial vein, no indication for anticoagulation at the current time. Patient states that it has been stable in size.        Fu in 6 m                Thank you very much for involving me in the care of your patient.  Please do not hesitate to contact me if there are any questions.      Karen Acevedo MD, FAC, [facility]  Interventional Cardiologist, [facility].           This note was dictated with the help of speech recognition software.  There might be un-intended errors and/or substitutions.

## 2023-03-30 ENCOUNTER — PES CALL (OUTPATIENT)
Dept: ADMINISTRATIVE | Facility: CLINIC | Age: 68
End: 2023-03-30
Payer: MEDICARE

## 2023-05-03 ENCOUNTER — PATIENT OUTREACH (OUTPATIENT)
Dept: ADMINISTRATIVE | Facility: HOSPITAL | Age: 68
End: 2023-05-03
Payer: MEDICARE

## 2023-05-08 ENCOUNTER — PATIENT MESSAGE (OUTPATIENT)
Dept: INTERNAL MEDICINE | Facility: CLINIC | Age: 68
End: 2023-05-08
Payer: MEDICARE

## 2023-05-09 ENCOUNTER — PES CALL (OUTPATIENT)
Dept: ADMINISTRATIVE | Facility: CLINIC | Age: 68
End: 2023-05-09
Payer: MEDICARE

## 2023-05-18 ENCOUNTER — TELEPHONE (OUTPATIENT)
Dept: INTERNAL MEDICINE | Facility: CLINIC | Age: 68
End: 2023-05-18
Payer: MEDICARE

## 2023-05-18 DIAGNOSIS — Z12.31 ENCOUNTER FOR SCREENING MAMMOGRAM FOR MALIGNANT NEOPLASM OF BREAST: ICD-10-CM

## 2023-05-18 DIAGNOSIS — R22.32 LOCALIZED SWELLING, MASS, OR LUMP OF LEFT UPPER EXTREMITY: Primary | ICD-10-CM

## 2023-05-18 NOTE — TELEPHONE ENCOUNTER
----- Message from Michelle Connor RT sent at 5/18/2023  5:52 PM CDT -----  Regarding: Need a new mammogram order  HI  Mrs. San's currant order expiered  3/21/23. Please order a new screening mammogram and attach it to her appointment on 5/29/23.  Sincerely,  [facility]

## 2023-05-19 ENCOUNTER — TELEPHONE (OUTPATIENT)
Dept: INTERNAL MEDICINE | Facility: CLINIC | Age: 68
End: 2023-05-19
Payer: MEDICARE

## 2023-05-19 NOTE — TELEPHONE ENCOUNTER
----- Message from Michelle Connor, RT sent at 2023  2:27 PM CDT -----  Regarding: Bone Density order   She also has an  Bone Density order for the same day, Please put in a new Bone Denisty too  ----- Message -----  From: Michelle Connor, RT  Sent: 2023   5:54 PM CDT  To: [facility] Staff  Subject: Need a new mammogram order                       HI,  Mrs. San's currant order expiered  3/21/23. Please order a new screening mammogram and attach it to her appointment on 23.  Sincerely,  [facility]

## 2023-05-19 NOTE — TELEPHONE ENCOUNTER
Order for screening mammo signed    If there is a concern, needs a different order for diagnostic/ultrasound and appt for breast exam. Please double check.    Thanks  JL

## 2023-05-29 ENCOUNTER — HOSPITAL ENCOUNTER (OUTPATIENT)
Dept: RADIOLOGY | Facility: OTHER | Age: 68
Discharge: HOME | End: 2023-05-29
Attending: STUDENT IN AN ORGANIZED HEALTH CARE EDUCATION/TRAINING PROGRAM
Payer: MEDICARE

## 2023-05-29 ENCOUNTER — TELEPHONE (OUTPATIENT)
Dept: INTERNAL MEDICINE | Facility: CLINIC | Age: 68
End: 2023-05-29
Payer: MEDICARE

## 2023-05-29 DIAGNOSIS — N95.9 POST MENOPAUSAL PROBLEMS: ICD-10-CM

## 2023-05-29 DIAGNOSIS — Z12.31 ENCOUNTER FOR SCREENING MAMMOGRAM FOR MALIGNANT NEOPLASM OF BREAST: ICD-10-CM

## 2023-05-29 DIAGNOSIS — N95.9 POST MENOPAUSAL PROBLEMS: Primary | ICD-10-CM

## 2023-05-29 PROCEDURE — 77080 DXA BONE DENSITY AXIAL SKELETON 1 OR MORE SITES: ICD-10-PCS | Mod: 26,,, | Performed by: RADIOLOGY

## 2023-05-29 PROCEDURE — 77080 DXA BONE DENSITY AXIAL: CPT | Mod: 26,,, | Performed by: RADIOLOGY

## 2023-05-29 PROCEDURE — 77063 MAMMO DIGITAL SCREENING BILAT WITH TOMO: ICD-10-PCS | Mod: 26,,, | Performed by: RADIOLOGY

## 2023-05-29 PROCEDURE — 77067 SCR MAMMO BI INCL CAD: CPT | Mod: TC

## 2023-05-29 PROCEDURE — 77080 DXA BONE DENSITY AXIAL: CPT | Mod: TC

## 2023-05-29 PROCEDURE — 77063 BREAST TOMOSYNTHESIS BI: CPT | Mod: 26,,, | Performed by: RADIOLOGY

## 2023-05-29 PROCEDURE — 77067 SCR MAMMO BI INCL CAD: CPT | Mod: 26,,, | Performed by: RADIOLOGY

## 2023-05-29 PROCEDURE — 77067 MAMMO DIGITAL SCREENING BILAT WITH TOMO: ICD-10-PCS | Mod: 26,,, | Performed by: RADIOLOGY

## 2023-05-29 NOTE — TELEPHONE ENCOUNTER
----- Message from Michelle Connor RT sent at 5/29/2023 10:03 AM CDT -----  Regarding: Needs a new bone density guerline WASSERMAN,  I am sending this message stat. I had sent the original request on the 18th. You did put in a mammogram order,  just not the Bone Density order.  Sincerely,  [facility]

## 2023-05-29 NOTE — TELEPHONE ENCOUNTER
----- Message from Michelle Connor, RT sent at 2023  9:35 AM CDT -----  Regarding: Needs a new Bone Density order  HI,  She is coming this afternoon for a Bone Density, her order  3/21/23. Can you please put in another order  for her bone today.  Thanks,  [facility]

## 2023-06-21 ENCOUNTER — OFFICE VISIT (OUTPATIENT)
Dept: HEMATOLOGY/ONCOLOGY | Facility: CLINIC | Age: 68
End: 2023-06-21
Payer: MEDICARE

## 2023-06-21 ENCOUNTER — TELEPHONE (OUTPATIENT)
Dept: HEMATOLOGY/ONCOLOGY | Facility: CLINIC | Age: 68
End: 2023-06-21
Payer: MEDICARE

## 2023-06-21 DIAGNOSIS — D50.9 IRON DEFICIENCY ANEMIA, UNSPECIFIED IRON DEFICIENCY ANEMIA TYPE: Primary | ICD-10-CM

## 2023-06-21 DIAGNOSIS — D50.9 IRON DEFICIENCY ANEMIA, UNSPECIFIED IRON DEFICIENCY ANEMIA TYPE: ICD-10-CM

## 2023-06-21 DIAGNOSIS — D70.0 CONGENITAL NEUTROPENIA: Primary | ICD-10-CM

## 2023-06-21 DIAGNOSIS — M81.6 LOCALIZED OSTEOPOROSIS WITHOUT CURRENT PATHOLOGICAL FRACTURE: ICD-10-CM

## 2023-06-21 PROCEDURE — 1158F PR ADVANCE CARE PLANNING DISCUSS DOCUMENTED IN MEDICAL RECORD: ICD-10-PCS | Mod: CPTII,95,, | Performed by: INTERNAL MEDICINE

## 2023-06-21 PROCEDURE — 99214 PR OFFICE/OUTPT VISIT, EST, LEVL IV, 30-39 MIN: ICD-10-PCS | Mod: 95,,, | Performed by: INTERNAL MEDICINE

## 2023-06-21 PROCEDURE — 1158F ADVNC CARE PLAN TLK DOCD: CPT | Mod: CPTII,95,, | Performed by: INTERNAL MEDICINE

## 2023-06-21 PROCEDURE — 99214 OFFICE O/P EST MOD 30 MIN: CPT | Mod: 95,,, | Performed by: INTERNAL MEDICINE

## 2023-06-21 NOTE — PROGRESS NOTES
Subjective:    The patient location is: home  Visit type: Virtual visit with synchronous audio and video  Face-to-face or time spent with patient on the encounter:25 min  Total time spent on and for  this encounter which includes non face-to-face time preparing to see patient, review of tests, obtaining and or reviewing separately obtained records documenting clinical information in the electronic or other health records, independently interpreting results which is not separately reported ,and communicating results to the patient/family/caregiver and in care coordination and treatment planning/communicating with pharmacy for prescriptions/addressing social needs/arranging follow-up and or referrals :30 min    Each patient I provide medical services by telemedicine is:  (1) informed of the relationship between the physician and patient and the respective role of any other health care provider with respect to management of the patient; and (2) notified that he or she may decline to receive medical services by telemedicine and may withdraw from such care at any time.  This is a video visit therefore some elements of the physical exam such as vital signs, heart sounds are breath sounds are not included and may be included if found in recent clinic notes of other providers assessing same patient. Any symptoms or signs that were visualized were stated by the patient may be included in this note.    Patient ID: Anh San is a 67 y.o. female.    Chief Complaint: neutropenia  HPI  For past several years in her 20's she had bone marrow bx, saw hematoologist6 5 years ago  Has osteoporosis. Believes in homeopathic remedies  Review of Systems    Patient denies issues related to appetite or recent weight change.  Feels well overall.  Denies issues with generalized weakness .  Denies fatigue over above what is normally experienced with day-to-day activities  Denies fever, chills, rigors  Denies issues with  ambulation  Denies generalized swelling or new lumps and bumps felt in any part  of body  Denies visual or hearing loss  Denies issues with congestion, sinus issues, cough, sputum production runny nose or itching eyes  Denies chest pain or palpitations, or passing out  Denies abdominal pain, reflux symptoms, nausea vomiting loose stools or constipation  Denies seizure activity or focal weaknesses or symptoms related to TIA, no head aches or blurred vision reported  Denies issues with skin rash or bruising  Denies issues with swelling of feet, tingling or numbness   No issues with sleep,   No recent foreign travel   Good family support reported         No past medical history on file.  Past Surgical History:   Procedure Laterality Date    ADENOIDECTOMY      bladder prolapse surgery      carpel tunnel repair       SECTION      x1     FRACTURE SURGERY      HERNIA REPAIR      TONSILLECTOMY       Family History   Problem Relation Age of Onset    Multiple sclerosis Mother         late onset    Hypertension Father     Heart disease Sister     Hypertension Sister     Coronary artery disease Sister         stents    Heart attack Sister     Heart attacks under age 50 Sister     HIV Brother     Depression Son     Anxiety disorder Son     No Known Problems Son     Anxiety disorder Son     No Known Problems Son     Stroke Maternal Grandmother     Colon cancer Neg Hx     Breast cancer Neg Hx     Ovarian cancer Neg Hx       Social History     Socioeconomic History    Marital status: Single   Tobacco Use    Smoking status: Never    Smokeless tobacco: Never   Substance and Sexual Activity    Alcohol use: Not Currently    Drug use: Never    Sexual activity: Not Currently     Partners: Male     Social Determinants of Health     Financial Resource Strain: Low Risk     Difficulty of Paying Living Expenses: Not hard at all   Food Insecurity: No Food Insecurity    Worried About Running Out of Food in the Last Year: Never true     Ran Out of Food in the Last Year: Never true   Transportation Needs: No Transportation Needs    Lack of Transportation (Medical): No    Lack of Transportation (Non-Medical): No   Physical Activity: Insufficiently Active    Days of Exercise per Week: 7 days    Minutes of Exercise per Session: 20 min   Stress: Stress Concern Present    Feeling of Stress : Rather much   Social Connections: Socially Isolated    Frequency of Communication with Friends and Family: More than three times a week    Frequency of Social Gatherings with Friends and Family: More than three times a week    Attends [employer] Services: Never    Active Member of Clubs or Organizations: No    Attends Club or Organization Meetings: Never    Marital Status:    Housing Stability: Unknown    Unable to Pay for Housing in the Last Year: No    Unstable Housing in the Last Year: No     Review of patient's allergies indicates:  No Known Allergies    Current Outpatient Medications:     amoxicillin-clavulanate 875-125mg (AUGMENTIN) 875-125 mg per tablet, Take 1 tablet by mouth 2 (two) times daily., Disp: 14 tablet, Rfl: 0    Physical Exam    VITAL SIGNS:  as above   GENERAL: appears well-built, well-nourished.  No anxiety, no agitation, and in no distress.  Patient is awake, alert, oriented and cooperative.  HEENT:  Showed no congestion. Trachea is central no obvious icterus or pallor noted no hoarseness. no obvious JVD   NECK:  Supple.  No JVD. No obvious cervical submental or supraclavicular adenopathy.  RS:the visualized portion of  Chest expands well. chest appears symmetric, no audible wheezes.  No dyspnea recognized  ABDOMEN:  abdomen appears undistended.  EXTREMITIES:  Without edema.  NEUROLOGICAL:  The patient is appropriate, higher functions are normal.  No  obvious neurological deficits.  normal judgement normal thought content  No confusion, no speech impediment. Cranial nerves are intact and show no deficit. No gross motor deficits noted    SKIN MUSCULOSKELETAL: no joint or skeletal deformity, no clubbing of nails.  No visible rash ecchymosis or petechiae   Lab Results   Component Value Date    WBC 3.25 (L) 06/27/2022    HGB 13.9 06/27/2022    HCT 40.6 06/27/2022    MCV 93 06/27/2022     06/27/2022       BMP  Lab Results   Component Value Date     02/17/2023    K 3.9 02/17/2023     02/17/2023    CO2 25 02/17/2023    BUN 15 02/17/2023    CREATININE 0.7 02/17/2023    CALCIUM 9.1 02/17/2023    ANIONGAP 9 02/17/2023    ESTGFRAFRICA >60 03/21/2022    EGFRNONAA >60 03/21/2022     No results found for: IRON, TIBC, FERRITIN, SATURATEDIRO      Patient Active Problem List   Diagnosis    Localized osteoporosis without current pathological fracture    Neutropenia    Arm mass, left        Assessment and Plan   Neutropenia: severql years, had bone marrow bx at 28 years and saw hematology 5 years ago, believe this is bengn cyclic and non pathologic, annual f/u should suffice    Osteoporosis: pt exercises and attmept natural remedies, recommend dw pcp for osteoporosis treatment and calcium for bone building   Advance Care Planning     Date: 06/21/2023    Power of   I initiated the process of voluntary advance care planning today by mailing out acp docs to pt to review, discuss , complte and return to file on her behalf

## 2023-06-22 ENCOUNTER — TELEPHONE (OUTPATIENT)
Dept: INTERNAL MEDICINE | Facility: CLINIC | Age: 68
End: 2023-06-22
Payer: MEDICARE

## 2023-06-22 DIAGNOSIS — M81.0 AGE-RELATED OSTEOPOROSIS WITHOUT CURRENT PATHOLOGICAL FRACTURE: ICD-10-CM

## 2023-06-22 DIAGNOSIS — M81.6 LOCALIZED OSTEOPOROSIS WITHOUT CURRENT PATHOLOGICAL FRACTURE: Primary | ICD-10-CM

## 2023-06-22 NOTE — TELEPHONE ENCOUNTER
----- Message from Candida Denton sent at 6/22/2023 10:24 AM CDT -----  .Type: Patient Call Back    Who called:self    What is the request in detail: needs referral sent to listed fax number for Dr keiry is at [facility] facility    Can the clinic reply by [facility]? call    Would the patient rather a call back or a response via [facility]? call    Best call back number:.[phone]     Additional Information:fax [phone]

## 2023-06-23 ENCOUNTER — TELEPHONE (OUTPATIENT)
Dept: INTERNAL MEDICINE | Facility: CLINIC | Age: 68
End: 2023-06-23
Payer: MEDICARE

## 2023-06-23 NOTE — TELEPHONE ENCOUNTER
----- Message from Surekha Roper sent at 6/23/2023  3:52 PM CDT -----  Name of Who is Calling: VINAY VENEGAS [[id number]]            What is the request in detail: Patient is requesting call back to fax referral for endocrinologist to [facility] dr kapoor  Fax [phone]      Can the clinic reply by [facility]:               What Number to Call Back if not in [facility]: [phone]

## 2023-06-23 NOTE — TELEPHONE ENCOUNTER
Patient was informed that I have faxed the referral again and also received the confirmation sating it was sent again.

## 2023-06-26 ENCOUNTER — PATIENT MESSAGE (OUTPATIENT)
Dept: HEMATOLOGY/ONCOLOGY | Facility: CLINIC | Age: 68
End: 2023-06-26
Payer: MEDICARE

## 2023-06-26 DIAGNOSIS — D50.9 IRON DEFICIENCY ANEMIA, UNSPECIFIED IRON DEFICIENCY ANEMIA TYPE: Primary | ICD-10-CM

## 2023-06-28 ENCOUNTER — TELEPHONE (OUTPATIENT)
Dept: INTERNAL MEDICINE | Facility: CLINIC | Age: 68
End: 2023-06-28
Payer: MEDICARE

## 2023-06-28 NOTE — TELEPHONE ENCOUNTER
----- Message from Surekha Roper sent at 6/28/2023  3:16 PM CDT -----  Name of Who is Calling: VINAY VENEGAS [[id number]]            What is the request in detail: Patient is requesting call back              Can the clinic reply by [facility]: no              [facility] [phone]

## 2023-06-28 NOTE — TELEPHONE ENCOUNTER
----- Message from Mary Figueroa MA sent at 6/28/2023  3:27 PM CDT -----  Name of Who is Calling Adria with [facility] Endocrinology dept on behalf of VINAY VENEGAS [[id number]]                What is the request in detail: They are requesting a bone density test and the results need to be faxed to number below. This needs to be done before getting scheduled for ENDOCRINOLOGY.   Please assist.                Can the clinic reply by [facility]: No                What Number to Call Back if not in [facility]: [phone]   [phone]

## 2023-06-28 NOTE — TELEPHONE ENCOUNTER
LVM for patient to call the office back in regards to needing to know what's the name of the endocrine doctor at [facility] she wanted the fax sent to.

## 2023-07-08 ENCOUNTER — PATIENT MESSAGE (OUTPATIENT)
Dept: INTERNAL MEDICINE | Facility: CLINIC | Age: 68
End: 2023-07-08
Payer: MEDICARE

## 2023-08-08 DIAGNOSIS — M81.6 LOCALIZED OSTEOPOROSIS WITHOUT CURRENT PATHOLOGICAL FRACTURE: ICD-10-CM

## 2023-08-08 DIAGNOSIS — D50.9 IRON DEFICIENCY ANEMIA, UNSPECIFIED IRON DEFICIENCY ANEMIA TYPE: Primary | ICD-10-CM

## 2023-08-08 DIAGNOSIS — D72.819 LEUKOPENIA, UNSPECIFIED TYPE: ICD-10-CM

## 2023-09-18 ENCOUNTER — LAB VISIT (OUTPATIENT)
Dept: LAB | Facility: OTHER | Age: 68
End: 2023-09-18
Attending: INTERNAL MEDICINE
Payer: MEDICARE

## 2023-09-18 DIAGNOSIS — D72.819 LEUKOPENIA, UNSPECIFIED TYPE: ICD-10-CM

## 2023-09-18 LAB
ALBUMIN SERPL BCP-MCNC: 4 G/DL (ref 3.5–5.2)
ALP SERPL-CCNC: 79 U/L (ref 55–135)
ALT SERPL W/O P-5'-P-CCNC: 12 U/L (ref 10–44)
ANION GAP SERPL CALC-SCNC: 9 MMOL/L (ref 8–16)
AST SERPL-CCNC: 17 U/L (ref 10–40)
BASOPHILS # BLD AUTO: 0.04 K/UL (ref 0–0.2)
BASOPHILS NFR BLD: 0.8 % (ref 0–1.9)
BILIRUB SERPL-MCNC: 0.4 MG/DL (ref 0.1–1)
BUN SERPL-MCNC: 11 MG/DL (ref 8–23)
CALCIUM SERPL-MCNC: 9.3 MG/DL (ref 8.7–10.5)
CHLORIDE SERPL-SCNC: 108 MMOL/L (ref 95–110)
CO2 SERPL-SCNC: 24 MMOL/L (ref 23–29)
CREAT SERPL-MCNC: 0.8 MG/DL (ref 0.5–1.4)
DIFFERENTIAL METHOD: ABNORMAL
EOSINOPHIL # BLD AUTO: 0.2 K/UL (ref 0–0.5)
EOSINOPHIL NFR BLD: 4.5 % (ref 0–8)
ERYTHROCYTE [DISTWIDTH] IN BLOOD BY AUTOMATED COUNT: 12.3 % (ref 11.5–14.5)
EST. GFR  (NO RACE VARIABLE): >60 ML/MIN/1.73 M^2
GLUCOSE SERPL-MCNC: 96 MG/DL (ref 70–110)
HCT VFR BLD AUTO: 40 % (ref 37–48.5)
HGB BLD-MCNC: 13.8 G/DL (ref 12–16)
IMM GRANULOCYTES # BLD AUTO: 0.01 K/UL (ref 0–0.04)
IMM GRANULOCYTES NFR BLD AUTO: 0.2 % (ref 0–0.5)
LYMPHOCYTES # BLD AUTO: 1.6 K/UL (ref 1–4.8)
LYMPHOCYTES NFR BLD: 30.2 % (ref 18–48)
MCH RBC QN AUTO: 31.8 PG (ref 27–31)
MCHC RBC AUTO-ENTMCNC: 34.5 G/DL (ref 32–36)
MCV RBC AUTO: 92 FL (ref 82–98)
MONOCYTES # BLD AUTO: 0.5 K/UL (ref 0.3–1)
MONOCYTES NFR BLD: 9 % (ref 4–15)
NEUTROPHILS # BLD AUTO: 2.8 K/UL (ref 1.8–7.7)
NEUTROPHILS NFR BLD: 55.3 % (ref 38–73)
NRBC BLD-RTO: 0 /100 WBC
PATH REV BLD -IMP: NORMAL
PLATELET # BLD AUTO: 259 K/UL (ref 150–450)
PMV BLD AUTO: 10.4 FL (ref 9.2–12.9)
POTASSIUM SERPL-SCNC: 4.1 MMOL/L (ref 3.5–5.1)
PROT SERPL-MCNC: 6.9 G/DL (ref 6–8.4)
RBC # BLD AUTO: 4.34 M/UL (ref 4–5.4)
SODIUM SERPL-SCNC: 141 MMOL/L (ref 136–145)
WBC # BLD AUTO: 5.13 K/UL (ref 3.9–12.7)

## 2023-09-18 PROCEDURE — 85060 BLOOD SMEAR INTERPRETATION: CPT | Mod: ,,, | Performed by: PATHOLOGY

## 2023-09-18 PROCEDURE — 85060 PATHOLOGIST REVIEW: ICD-10-PCS | Mod: ,,, | Performed by: PATHOLOGY

## 2023-09-18 PROCEDURE — 85025 COMPLETE CBC W/AUTO DIFF WBC: CPT | Performed by: STUDENT IN AN ORGANIZED HEALTH CARE EDUCATION/TRAINING PROGRAM

## 2023-09-18 PROCEDURE — 80053 COMPREHEN METABOLIC PANEL: CPT | Performed by: STUDENT IN AN ORGANIZED HEALTH CARE EDUCATION/TRAINING PROGRAM

## 2023-09-18 PROCEDURE — 36415 COLL VENOUS BLD VENIPUNCTURE: CPT | Performed by: STUDENT IN AN ORGANIZED HEALTH CARE EDUCATION/TRAINING PROGRAM

## 2023-09-19 LAB — PATH REV BLD -IMP: NORMAL

## 2023-09-21 ENCOUNTER — TELEPHONE (OUTPATIENT)
Dept: INTERNAL MEDICINE | Facility: CLINIC | Age: 68
End: 2023-09-21
Payer: MEDICARE

## 2023-09-21 DIAGNOSIS — R22.32 ARM MASS, LEFT: Primary | ICD-10-CM

## 2023-09-21 NOTE — TELEPHONE ENCOUNTER
4:24 PM called and spoke to patient. She reports that lesion on her arm is significantly smaller - no pain and no overlying skin changes. Will place US order for repeat imaging - if any change noted on US, will have low threshold to refer to vascular surgery. She verbalizes understanding and is amenable to plan. -COY

## 2023-10-18 ENCOUNTER — OFFICE VISIT (OUTPATIENT)
Dept: HEMATOLOGY/ONCOLOGY | Facility: CLINIC | Age: 68
End: 2023-10-18
Payer: MEDICARE

## 2023-10-18 VITALS
DIASTOLIC BLOOD PRESSURE: 96 MMHG | WEIGHT: 110.69 LBS | BODY MASS INDEX: 21.73 KG/M2 | HEIGHT: 60 IN | SYSTOLIC BLOOD PRESSURE: 138 MMHG | HEART RATE: 73 BPM | OXYGEN SATURATION: 97 %

## 2023-10-18 DIAGNOSIS — D72.819 LEUKOPENIA, UNSPECIFIED TYPE: Primary | ICD-10-CM

## 2023-10-18 PROCEDURE — 3008F PR BODY MASS INDEX (BMI) DOCUMENTED: ICD-10-PCS | Mod: CPTII,S$GLB,, | Performed by: STUDENT IN AN ORGANIZED HEALTH CARE EDUCATION/TRAINING PROGRAM

## 2023-10-18 PROCEDURE — 3075F PR MOST RECENT SYSTOLIC BLOOD PRESS GE 130-139MM HG: ICD-10-PCS | Mod: CPTII,S$GLB,, | Performed by: STUDENT IN AN ORGANIZED HEALTH CARE EDUCATION/TRAINING PROGRAM

## 2023-10-18 PROCEDURE — 3075F SYST BP GE 130 - 139MM HG: CPT | Mod: CPTII,S$GLB,, | Performed by: STUDENT IN AN ORGANIZED HEALTH CARE EDUCATION/TRAINING PROGRAM

## 2023-10-18 PROCEDURE — 99213 OFFICE O/P EST LOW 20 MIN: CPT | Mod: S$GLB,,, | Performed by: STUDENT IN AN ORGANIZED HEALTH CARE EDUCATION/TRAINING PROGRAM

## 2023-10-18 PROCEDURE — 1101F PT FALLS ASSESS-DOCD LE1/YR: CPT | Mod: CPTII,S$GLB,, | Performed by: STUDENT IN AN ORGANIZED HEALTH CARE EDUCATION/TRAINING PROGRAM

## 2023-10-18 PROCEDURE — 3080F DIAST BP >= 90 MM HG: CPT | Mod: CPTII,S$GLB,, | Performed by: STUDENT IN AN ORGANIZED HEALTH CARE EDUCATION/TRAINING PROGRAM

## 2023-10-18 PROCEDURE — 3288F FALL RISK ASSESSMENT DOCD: CPT | Mod: CPTII,S$GLB,, | Performed by: STUDENT IN AN ORGANIZED HEALTH CARE EDUCATION/TRAINING PROGRAM

## 2023-10-18 PROCEDURE — 99999 PR PBB SHADOW E&M-EST. PATIENT-LVL III: ICD-10-PCS | Mod: PBBFAC,,, | Performed by: STUDENT IN AN ORGANIZED HEALTH CARE EDUCATION/TRAINING PROGRAM

## 2023-10-18 PROCEDURE — 1126F PR PAIN SEVERITY QUANTIFIED, NO PAIN PRESENT: ICD-10-PCS | Mod: CPTII,S$GLB,, | Performed by: STUDENT IN AN ORGANIZED HEALTH CARE EDUCATION/TRAINING PROGRAM

## 2023-10-18 PROCEDURE — 1126F AMNT PAIN NOTED NONE PRSNT: CPT | Mod: CPTII,S$GLB,, | Performed by: STUDENT IN AN ORGANIZED HEALTH CARE EDUCATION/TRAINING PROGRAM

## 2023-10-18 PROCEDURE — 1159F MED LIST DOCD IN RCRD: CPT | Mod: CPTII,S$GLB,, | Performed by: STUDENT IN AN ORGANIZED HEALTH CARE EDUCATION/TRAINING PROGRAM

## 2023-10-18 PROCEDURE — 99999 PR PBB SHADOW E&M-EST. PATIENT-LVL III: CPT | Mod: PBBFAC,,, | Performed by: STUDENT IN AN ORGANIZED HEALTH CARE EDUCATION/TRAINING PROGRAM

## 2023-10-18 PROCEDURE — 1101F PR PT FALLS ASSESS DOC 0-1 FALLS W/OUT INJ PAST YR: ICD-10-PCS | Mod: CPTII,S$GLB,, | Performed by: STUDENT IN AN ORGANIZED HEALTH CARE EDUCATION/TRAINING PROGRAM

## 2023-10-18 PROCEDURE — 3288F PR FALLS RISK ASSESSMENT DOCUMENTED: ICD-10-PCS | Mod: CPTII,S$GLB,, | Performed by: STUDENT IN AN ORGANIZED HEALTH CARE EDUCATION/TRAINING PROGRAM

## 2023-10-18 PROCEDURE — 1160F PR REVIEW ALL MEDS BY PRESCRIBER/CLIN PHARMACIST DOCUMENTED: ICD-10-PCS | Mod: CPTII,S$GLB,, | Performed by: STUDENT IN AN ORGANIZED HEALTH CARE EDUCATION/TRAINING PROGRAM

## 2023-10-18 PROCEDURE — 3080F PR MOST RECENT DIASTOLIC BLOOD PRESSURE >= 90 MM HG: ICD-10-PCS | Mod: CPTII,S$GLB,, | Performed by: STUDENT IN AN ORGANIZED HEALTH CARE EDUCATION/TRAINING PROGRAM

## 2023-10-18 PROCEDURE — 1160F RVW MEDS BY RX/DR IN RCRD: CPT | Mod: CPTII,S$GLB,, | Performed by: STUDENT IN AN ORGANIZED HEALTH CARE EDUCATION/TRAINING PROGRAM

## 2023-10-18 PROCEDURE — 3008F BODY MASS INDEX DOCD: CPT | Mod: CPTII,S$GLB,, | Performed by: STUDENT IN AN ORGANIZED HEALTH CARE EDUCATION/TRAINING PROGRAM

## 2023-10-18 PROCEDURE — 1159F PR MEDICATION LIST DOCUMENTED IN MEDICAL RECORD: ICD-10-PCS | Mod: CPTII,S$GLB,, | Performed by: STUDENT IN AN ORGANIZED HEALTH CARE EDUCATION/TRAINING PROGRAM

## 2023-10-18 PROCEDURE — 99213 PR OFFICE/OUTPT VISIT, EST, LEVL III, 20-29 MIN: ICD-10-PCS | Mod: S$GLB,,, | Performed by: STUDENT IN AN ORGANIZED HEALTH CARE EDUCATION/TRAINING PROGRAM

## 2023-10-18 NOTE — PROGRESS NOTES
Hematology- Oncology Clinic Note :      10/18/2023    RFV / chief complaint- Follow-up (Leukopenia)        HPI  Pt is a 68 y.o. female who  has no past medical history on file.   Pt presents to the clinic today for leukopenia    Pt reports to be in her usual state of health. She wanted to be proactive and get evaluated.   She has long standing hx of leukopenia. She underwent BMBx at age 28 for leukopenia.     No recurrent infections. She has b symptoms. Good appetite.    She has been cured for Hep C     She drinks wine every night or so          Reviewed past medical/surgical/social history    History reviewed. No pertinent past medical history.   Past Surgical History:   Procedure Laterality Date    ADENOIDECTOMY      bladder prolapse surgery      carpel tunnel repair       SECTION      x1     FRACTURE SURGERY      HERNIA REPAIR      TONSILLECTOMY        Review of patient's allergies indicates:  No Known Allergies   Social History     Tobacco Use    Smoking status: Never    Smokeless tobacco: Never   Substance Use Topics    Alcohol use: Not Currently      Family History   Problem Relation Age of Onset    Multiple sclerosis Mother         late onset    Hypertension Father     Heart disease Sister     Hypertension Sister     Coronary artery disease Sister         stents    Heart attack Sister     Heart attacks under age 50 Sister     HIV Brother     Depression Son     Anxiety disorder Son     No Known Problems Son     Anxiety disorder Son     No Known Problems Son     Stroke Maternal Grandmother     Colon cancer Neg Hx     Breast cancer Neg Hx     Ovarian cancer Neg Hx           Review of Systems :  Review of Systems   Constitutional:  Negative for chills, diaphoresis, fever, malaise/fatigue and weight loss.   HENT: Negative.  Negative for congestion, hearing loss, nosebleeds, sore throat and tinnitus.    Eyes: Negative.  Negative for blurred vision and discharge.   Respiratory:  Negative for cough,  hemoptysis, sputum production, shortness of breath and wheezing.    Cardiovascular: Negative.  Negative for chest pain, palpitations and leg swelling.   Gastrointestinal: Negative.  Negative for abdominal pain, blood in stool, constipation, diarrhea, heartburn, melena, nausea and vomiting.   Genitourinary: Negative.    Musculoskeletal: Negative.  Negative for back pain, falls, joint pain and myalgias.   Skin: Negative.  Negative for itching and rash.   Neurological: Negative.  Negative for dizziness, tingling, sensory change, speech change, focal weakness, seizures, loss of consciousness, weakness and headaches.   Endo/Heme/Allergies: Negative.  Does not bruise/bleed easily.   Psychiatric/Behavioral: Negative.  Negative for depression. The patient is not nervous/anxious and does not have insomnia.                Physical Exam :  BP (!) 138/96 (BP Location: Right arm, Patient Position: Sitting, BP Method: Medium (Automatic))   Pulse 73   Ht 5' (1.524 m)   Wt 50.2 kg (110 lb 10.7 oz)   SpO2 97%   BMI 21.61 kg/m²   Wt Readings from Last 3 Encounters:   10/18/23 50.2 kg (110 lb 10.7 oz)   02/17/23 49.4 kg (109 lb)   02/14/23 49.8 kg (109 lb 12.6 oz)       Body mass index is 21.61 kg/m².      Physical Exam  Vitals and nursing note reviewed.   Constitutional:       General: She is not in acute distress.     Appearance: Normal appearance. She is not ill-appearing.   HENT:      Head: Normocephalic and atraumatic.      Right Ear: External ear normal.      Left Ear: External ear normal.      Mouth/Throat:      Pharynx: No oropharyngeal exudate.   Eyes:      General: No scleral icterus.        Right eye: No discharge.         Left eye: No discharge.   Cardiovascular:      Rate and Rhythm: Normal rate.   Pulmonary:      Effort: Pulmonary effort is normal. No respiratory distress.   Abdominal:      General: There is no distension.   Musculoskeletal:         General: No swelling or deformity.      Cervical back: Normal range  of motion and neck supple.      Right lower leg: No edema.      Left lower leg: No edema.   Skin:     Coloration: Skin is not jaundiced.      Findings: No bruising, erythema, lesion or rash.   Neurological:      General: No focal deficit present.      Mental Status: She is alert and oriented to person, place, and time. Mental status is at baseline.      Coordination: Coordination normal.      Gait: Gait normal.   Psychiatric:         Mood and Affect: Mood normal.         Behavior: Behavior normal.             Current Outpatient Medications   Medication Sig Dispense Refill    amoxicillin-clavulanate 875-125mg (AUGMENTIN) 875-125 mg per tablet Take 1 tablet by mouth 2 (two) times daily. (Patient not taking: Reported on 10/18/2023) 14 tablet 0     No current facility-administered medications for this visit.       Pertinent Diagnostic studies:      No visits with results within 1 Week(s) from this visit.   Latest known visit with results is:   Lab Visit on 09/18/2023   Component Date Value Ref Range Status    Sodium 09/18/2023 141  136 - 145 mmol/L Final    Potassium 09/18/2023 4.1  3.5 - 5.1 mmol/L Final    Chloride 09/18/2023 108  95 - 110 mmol/L Final    CO2 09/18/2023 24  23 - 29 mmol/L Final    Glucose 09/18/2023 96  70 - 110 mg/dL Final    BUN 09/18/2023 11  8 - 23 mg/dL Final    Creatinine 09/18/2023 0.8  0.5 - 1.4 mg/dL Final    Calcium 09/18/2023 9.3  8.7 - 10.5 mg/dL Final    Total Protein 09/18/2023 6.9  6.0 - 8.4 g/dL Final    Albumin 09/18/2023 4.0  3.5 - 5.2 g/dL Final    Total Bilirubin 09/18/2023 0.4  0.1 - 1.0 mg/dL Final    Comment: For infants and newborns, interpretation of results should be based  on gestational age, weight and in agreement with clinical  observations.    Premature Infant recommended reference ranges:  Up to 24 hours.............<8.0 mg/dL  Up to 48 hours............<12.0 mg/dL  3-5 days..................<15.0 mg/dL  6-29 days.................<15.0 mg/dL      Alkaline Phosphatase  09/18/2023 79  55 - 135 U/L Final    AST 09/18/2023 17  10 - 40 U/L Final    ALT 09/18/2023 12  10 - 44 U/L Final    eGFR 09/18/2023 >60  >60 mL/min/1.73 m^2 Final    Anion Gap 09/18/2023 9  8 - 16 mmol/L Final    WBC 09/18/2023 5.13  3.90 - 12.70 K/uL Final    RBC 09/18/2023 4.34  4.00 - 5.40 M/uL Final    Hemoglobin 09/18/2023 13.8  12.0 - 16.0 g/dL Final    Hematocrit 09/18/2023 40.0  37.0 - 48.5 % Final    MCV 09/18/2023 92  82 - 98 fL Final    MCH 09/18/2023 31.8 (H)  27.0 - 31.0 pg Final    MCHC 09/18/2023 34.5  32.0 - 36.0 g/dL Final    RDW 09/18/2023 12.3  11.5 - 14.5 % Final    Platelets 09/18/2023 259  150 - 450 K/uL Final    MPV 09/18/2023 10.4  9.2 - 12.9 fL Final    Immature Granulocytes 09/18/2023 0.2  0.0 - 0.5 % Final    Gran # (ANC) 09/18/2023 2.8  1.8 - 7.7 K/uL Final    Immature Grans (Abs) 09/18/2023 0.01  0.00 - 0.04 K/uL Final    Comment: Mild elevation in immature granulocytes is non specific and   can be seen in a variety of conditions including stress response,   acute inflammation, trauma and pregnancy. Correlation with other   laboratory and clinical findings is essential.      Lymph # 09/18/2023 1.6  1.0 - 4.8 K/uL Final    Mono # 09/18/2023 0.5  0.3 - 1.0 K/uL Final    Eos # 09/18/2023 0.2  0.0 - 0.5 K/uL Final    Baso # 09/18/2023 0.04  0.00 - 0.20 K/uL Final    nRBC 09/18/2023 0  0 /100 WBC Final    Gran % 09/18/2023 55.3  38.0 - 73.0 % Final    Lymph % 09/18/2023 30.2  18.0 - 48.0 % Final    Mono % 09/18/2023 9.0  4.0 - 15.0 % Final    Eosinophil % 09/18/2023 4.5  0.0 - 8.0 % Final    Basophil % 09/18/2023 0.8  0.0 - 1.9 % Final    Differential Method 09/18/2023 Automated   Final    Pathologist Review 09/18/2023 Review required   Final    Pathologist Review Peripheral Smear 09/18/2023 REVIEWED   Final    Comment:   Electronically reviewed and signed by:  ALICE Holbrook MD  Signed on 09/19/23 at 14:35  The red blood cells show minimal anisopoikilocytosis. There are no   nucleated  or fragmented red cell forms identified.  The white blood   cell count and morphology are within normal limits.  The platelet   count and morphology are within normal limits with no satellitosis or   platelet clumps identified.     Interpreted by Lydia Holbrook M.D.             Oncology History    No history exists.     Assessment :       1. Leukopenia, unspecified type        Plan :       Leukopenia , ANC wnl   Not symptomatic.   Could be benign ethnical/ constitutional   Labs this visit with no cytopenias.   Continue f/u with pcp . RTC prn    Hep C s/p treatment. Viral load undetectable.            Electronically signed by Keyla Yanes    [facility]      Future Appointments   Date Time Provider Department Center   6/12/2024 11:00 AM LAB, SAME DAY [facility]   6/19/2024  2:00 PM Selin Chin MD [facility] HEM ON O at [facility]           This note was created with voice recognition software.  Grammatical, syntax and spelling errors may be inevitable.

## 2023-10-19 DIAGNOSIS — M81.6 LOCALIZED OSTEOPOROSIS (LEQUESNE): ICD-10-CM

## 2023-10-19 DIAGNOSIS — Z78.0 POST-MENOPAUSAL: ICD-10-CM

## 2023-10-19 DIAGNOSIS — E55.9 VITAMIN D DEFICIENCY: Primary | ICD-10-CM

## 2023-10-19 DIAGNOSIS — M81.0 OSTEOPOROSIS WITHOUT CURRENT PATHOLOGICAL FRACTURE, UNSPECIFIED OSTEOPOROSIS TYPE: ICD-10-CM

## 2023-11-14 ENCOUNTER — HOSPITAL ENCOUNTER (OUTPATIENT)
Dept: RADIOLOGY | Facility: OTHER | Age: 68
Discharge: HOME | End: 2023-11-14
Attending: INTERNAL MEDICINE
Payer: MEDICARE

## 2023-11-14 DIAGNOSIS — Z78.0 POST-MENOPAUSAL: ICD-10-CM

## 2023-11-14 DIAGNOSIS — M81.0 OSTEOPOROSIS WITHOUT CURRENT PATHOLOGICAL FRACTURE, UNSPECIFIED OSTEOPOROSIS TYPE: ICD-10-CM

## 2023-11-14 DIAGNOSIS — E55.9 VITAMIN D DEFICIENCY: ICD-10-CM

## 2023-12-13 ENCOUNTER — HOSPITAL ENCOUNTER (OUTPATIENT)
Dept: RADIOLOGY | Facility: OTHER | Age: 68
Discharge: HOME | End: 2023-12-13
Attending: INTERNAL MEDICINE
Payer: MEDICARE

## 2023-12-13 PROCEDURE — 72070 X-RAY EXAM THORAC SPINE 2VWS: CPT | Mod: TC,FY

## 2023-12-13 PROCEDURE — 72100 X-RAY EXAM L-S SPINE 2/3 VWS: CPT | Mod: TC,FY

## 2023-12-13 PROCEDURE — 72100 X-RAY EXAM L-S SPINE 2/3 VWS: CPT | Mod: 26,,, | Performed by: RADIOLOGY

## 2023-12-13 PROCEDURE — 72070 X-RAY EXAM THORAC SPINE 2VWS: CPT | Mod: 26,,, | Performed by: RADIOLOGY

## 2023-12-13 PROCEDURE — 72070 XR THORACIC SPINE AP LATERAL: ICD-10-PCS | Mod: 26,,, | Performed by: RADIOLOGY

## 2023-12-13 PROCEDURE — 72100 XR LUMBAR SPINE 2 OR 3 VIEWS: ICD-10-PCS | Mod: 26,,, | Performed by: RADIOLOGY

## 2024-01-16 ENCOUNTER — PATIENT OUTREACH (OUTPATIENT)
Dept: ADMINISTRATIVE | Facility: HOSPITAL | Age: 69
End: 2024-01-16
Payer: MEDICARE

## 2024-01-18 ENCOUNTER — PATIENT MESSAGE (OUTPATIENT)
Dept: HEMATOLOGY/ONCOLOGY | Facility: CLINIC | Age: 69
End: 2024-01-18
Payer: MEDICARE

## 2024-01-31 ENCOUNTER — LAB VISIT (OUTPATIENT)
Dept: LAB | Facility: OTHER | Age: 69
End: 2024-01-31
Attending: INTERNAL MEDICINE
Payer: MEDICARE

## 2024-01-31 DIAGNOSIS — Z78.0 POST-MENOPAUSAL: ICD-10-CM

## 2024-01-31 DIAGNOSIS — M81.0 OSTEOPOROSIS WITHOUT CURRENT PATHOLOGICAL FRACTURE, UNSPECIFIED OSTEOPOROSIS TYPE: ICD-10-CM

## 2024-01-31 DIAGNOSIS — E55.9 VITAMIN D DEFICIENCY: ICD-10-CM

## 2024-01-31 DIAGNOSIS — M81.6 LOCALIZED OSTEOPOROSIS (LEQUESNE): ICD-10-CM

## 2024-01-31 LAB
25(OH)D3+25(OH)D2 SERPL-MCNC: 34 NG/ML (ref 30–96)
ANION GAP SERPL CALC-SCNC: 6 MMOL/L (ref 8–16)
BUN SERPL-MCNC: 11 MG/DL (ref 8–23)
CALCIUM SERPL-MCNC: 9.1 MG/DL (ref 8.7–10.5)
CHLORIDE SERPL-SCNC: 109 MMOL/L (ref 95–110)
CO2 SERPL-SCNC: 26 MMOL/L (ref 23–29)
CREAT SERPL-MCNC: 0.7 MG/DL (ref 0.5–1.4)
EST. GFR  (NO RACE VARIABLE): >60 ML/MIN/1.73 M^2
GLUCOSE SERPL-MCNC: 91 MG/DL (ref 70–110)
PHOSPHATE SERPL-MCNC: 3.6 MG/DL (ref 2.7–4.5)
POTASSIUM SERPL-SCNC: 3.8 MMOL/L (ref 3.5–5.1)
PTH-INTACT SERPL-MCNC: 90.7 PG/ML (ref 9–77)
SODIUM SERPL-SCNC: 141 MMOL/L (ref 136–145)
T4 FREE SERPL-MCNC: 1.04 NG/DL (ref 0.71–1.51)
TSH SERPL DL<=0.005 MIU/L-ACNC: 1.96 UIU/ML (ref 0.4–4)

## 2024-01-31 PROCEDURE — 84100 ASSAY OF PHOSPHORUS: CPT | Performed by: INTERNAL MEDICINE

## 2024-01-31 PROCEDURE — 86364 TISS TRNSGLTMNASE EA IG CLAS: CPT | Mod: 59 | Performed by: INTERNAL MEDICINE

## 2024-01-31 PROCEDURE — 84443 ASSAY THYROID STIM HORMONE: CPT | Performed by: INTERNAL MEDICINE

## 2024-01-31 PROCEDURE — 80048 BASIC METABOLIC PNL TOTAL CA: CPT | Performed by: INTERNAL MEDICINE

## 2024-01-31 PROCEDURE — 83970 ASSAY OF PARATHORMONE: CPT | Performed by: INTERNAL MEDICINE

## 2024-01-31 PROCEDURE — 82523 COLLAGEN CROSSLINKS: CPT | Performed by: INTERNAL MEDICINE

## 2024-01-31 PROCEDURE — 84439 ASSAY OF FREE THYROXINE: CPT | Performed by: INTERNAL MEDICINE

## 2024-01-31 PROCEDURE — 82306 VITAMIN D 25 HYDROXY: CPT | Performed by: INTERNAL MEDICINE

## 2024-02-02 LAB — COLLAGEN CTX SERPL-MCNC: 652 PG/ML

## 2024-02-05 LAB
TTG IGA SER-ACNC: 1 U/ML
TTG IGG SER-ACNC: 0.9 U/ML

## 2024-02-07 ENCOUNTER — LAB VISIT (OUTPATIENT)
Dept: LAB | Facility: OTHER | Age: 69
End: 2024-02-07
Attending: INTERNAL MEDICINE
Payer: MEDICARE

## 2024-02-07 DIAGNOSIS — Z78.0 POST-MENOPAUSAL: ICD-10-CM

## 2024-02-07 DIAGNOSIS — E55.9 VITAMIN D DEFICIENCY: ICD-10-CM

## 2024-02-07 DIAGNOSIS — M81.0 OSTEOPOROSIS WITHOUT CURRENT PATHOLOGICAL FRACTURE, UNSPECIFIED OSTEOPOROSIS TYPE: ICD-10-CM

## 2024-02-07 LAB
CALCIUM 24H UR-MRATE: 6 MG/HR (ref 4–12)
CALCIUM UR-MCNC: 7.9 MG/DL (ref 0–15)
CALCIUM URINE (MG/SPEC): 142 MG/SPEC
CREAT 24H UR-MRATE: 32.8 MG/HR (ref 40–75)
CREAT UR-MCNC: 43.7 MG/DL (ref 15–325)
CREATININE, URINE (MG/SPEC): 786.6 MG/SPEC
URINE COLLECTION DURATION: 24 HR
URINE COLLECTION DURATION: 24 HR
URINE VOLUME: 1800 ML
URINE VOLUME: 1800 ML

## 2024-02-07 PROCEDURE — 82570 ASSAY OF URINE CREATININE: CPT | Performed by: INTERNAL MEDICINE

## 2024-02-07 PROCEDURE — 82340 ASSAY OF CALCIUM IN URINE: CPT | Performed by: INTERNAL MEDICINE

## 2024-03-21 ENCOUNTER — ON-DEMAND VIRTUAL (OUTPATIENT)
Dept: URGENT CARE | Facility: CLINIC | Age: 69
End: 2024-03-21
Payer: MEDICARE

## 2024-03-21 DIAGNOSIS — K21.9 GASTROESOPHAGEAL REFLUX DISEASE, UNSPECIFIED WHETHER ESOPHAGITIS PRESENT: Primary | ICD-10-CM

## 2024-03-21 PROCEDURE — 99213 OFFICE O/P EST LOW 20 MIN: CPT | Mod: 95,,, | Performed by: FAMILY MEDICINE

## 2024-03-21 RX ORDER — PANTOPRAZOLE SODIUM 40 MG/1
40 TABLET, DELAYED RELEASE ORAL DAILY
Qty: 28 TABLET | Refills: 0 | Status: SHIPPED | OUTPATIENT
Start: 2024-03-21 | End: 2024-05-29

## 2024-03-21 NOTE — PROGRESS NOTES
Subjective:      Patient ID: Anh San is a 68 y.o. female.    Vitals:  vitals were not taken for this visit.     Chief Complaint: No chief complaint on file.      Visit Type: TELE AUDIOVISUAL    Present with the patient at the time of consultation: TELEMED PRESENT WITH PATIENT: None    History reviewed. No pertinent past medical history.  Past Surgical History:   Procedure Laterality Date    ADENOIDECTOMY      bladder prolapse surgery      carpel tunnel repair       SECTION      x1 1978    FRACTURE SURGERY      HERNIA REPAIR      TONSILLECTOMY       Review of patient's allergies indicates:  No Known Allergies  Current Outpatient Medications on File Prior to Visit   Medication Sig Dispense Refill    amoxicillin-clavulanate 875-125mg (AUGMENTIN) 875-125 mg per tablet Take 1 tablet by mouth 2 (two) times daily. (Patient not taking: Reported on 10/18/2023) 14 tablet 0     No current facility-administered medications on file prior to visit.     Family History   Problem Relation Age of Onset    Multiple sclerosis Mother         late onset    Hypertension Father     Heart disease Sister     Hypertension Sister     Coronary artery disease Sister         stents    Heart attack Sister     Heart attacks under age 50 Sister     HIV Brother     Depression Son     Anxiety disorder Son     No Known Problems Son     Anxiety disorder Son     No Known Problems Son     Stroke Maternal Grandmother     Colon cancer Neg Hx     Breast cancer Neg Hx     Ovarian cancer Neg Hx        Medications Ordered                [id number] - [place], [place] - [address]   [address], [facility] [address]    Telephone: [phone]   Fax: [phone]   Hours: Not open 24 hours                         E-Prescribed (1 of 1)              pantoprazole (PROTONIX) 40 MG tablet    Sig: Take 1 tablet (40 mg total) by mouth once daily.       Start: 3/21/24     Quantity: 28 tablet Refills: 0                            Ohs Peq Odvv Intake    3/21/2024  3:18 PM CDT - Filed by Patient   What is your current physical address in the event of a medical emergency? [address]   Are you able to take your vital signs? Yes   Systolic Blood Pressure: 130   Diastolic Blood Pressure: 81   Weight: 109   Height: 60   Pulse:    Temperature: 97.6   Respiration rate:    Pulse Oxygen:    Please attach any relevant images or files          HPI: 69 y/o F with hx of GERD reports a flare up of symptoms after her tour of [employer]. Reports reflux symptoms. Denies abd pain, N/V, fever, weakness. Pt is requesting refill of Protonix.   ROS     Objective:   The physical exam was conducted virtually.  Physical Exam   Constitutional: She is oriented to person, place, and time. She appears well-developed.  Non-toxic appearance. She does not appear ill. No distress.   HENT:   Head: Normocephalic and atraumatic.   Nose: Nose normal.   Mouth/Throat: Mucous membranes are normal.   Eyes: Conjunctivae and lids are normal.   Neck: Trachea normal. Neck supple.   Cardiovascular: Normal rate, regular rhythm and normal heart sounds.   Pulmonary/Chest: Effort normal and breath sounds normal. No stridor. No respiratory distress. She has no wheezes.   Abdominal: Normal appearance and bowel sounds are normal. She exhibits no distension and no mass. Soft. There is no abdominal tenderness.   Musculoskeletal: Normal range of motion.         General: Normal range of motion.   Neurological: She is alert, oriented to person, place, and time and at baseline. She has normal strength.   Skin: Skin is warm, dry, intact, not diaphoretic and not pale.   Psychiatric: Her speech is normal and behavior is normal. Judgment and thought content normal.   Nursing note and vitals reviewed.      Assessment:     1. Gastroesophageal reflux disease, unspecified whether esophagitis present        Plan:   Discussed exam findings/diagnosis/plan with patient. Dietary precautions  advised. Instructions regarding the visit diagnosis and management advised. Advised to f/u with PCP within 2-5 days. ER precautions given if symptoms get any worse. All questions answered. Patient verbally understood and agreed with treatment plan.      Gastroesophageal reflux disease, unspecified whether esophagitis present    Other orders  -     pantoprazole (PROTONIX) 40 MG tablet; Take 1 tablet (40 mg total) by mouth once daily.  Dispense: 28 tablet; Refill: 0

## 2024-04-17 NOTE — TELEPHONE ENCOUNTER
--------------  ADMISSION REVIEW     Payor: [employer] MEDICARE ADV PPO  Subscriber #:  [id number]  Authorization Number: [id number]    Admit date: 4/16/24  Admit time: 12:06 PM       REVIEW DOCUMENTATION:     ED Provider Notes        ED Provider Notes signed by Aleksandr Morley MD at 4/16/2024 11:37 AM       Author: Aleksandr Morley MD Service: -- Author Type: Physician    Filed: 4/16/2024 11:37 AM Date of Service: 4/16/2024  9:51 AM Status: Signed    : Aleksandr Morley MD (Physician)           Patient Seen in: [facility] Emergency Department      History     Chief Complaint   Patient presents with    GI Bleeding     Stated Complaint: GI bleed    Subjective:   HPI    89-year-old male with history of atrial fibrillation on Coumadin, prior DVT, diabetes mellitus CHF hypertension hyperlipidemia and diverticular disease who presents evaluation hematochezia.  Brought in from nursing home today for acute onset of bright red blood per rectum.  1 episode noted this morning of bright red blood in diaper and saturating bedsheets.  Patient denies chest pain shortness breath lightheadedness but does note that he feels \"beat up\".      Objective:   Past Medical History:    Anxiety    Arrhythmia    Atrial fibrillation (HCC)    Back problem    Blood disorder    DVT    BPH (benign prostatic hyperplasia)    BPH (benign prostatic hyperplasia)    Calculus of kidney    Congestive heart disease (HCC)    Dementia (HCC)    Diabetes (HCC)    Diabetes mellitus (HCC)    DVT (deep venous thrombosis) (HCC)    Dyspnea on exertion    Esophageal reflux    Glaucoma    Hearing impairment    High blood pressure    High cholesterol    HYPERLIPIDEMIA    Hypertension    IBS (irritable bowel syndrome)    Irritable bowel syndrome    diverticulitis    Kidney disease    stone    Osteoarthritis    OTHER DISEASES    dvt    OTHER DISEASES    schrapnel shoulder    OTHER DISEASES    diverticulitis    Pneumonia due to organism  No concern.  Pt was scheduled for 5/29/23.      Rotator cuff disorder    Spinal stenosis    Visual impairment              Past Surgical History:   Procedure Laterality Date    Cystouretro & or pyeloscope N/A 2014    Procedure: CYSTOSCOPY/URETEROSCOPY/STONE EXTRACTION;  Surgeon: Amrit Morales MD;  Location: [facility]    Ir ivc filter removal  2019    T12 kyphoplasty; IVC filter removal    Ir kyphoplasty  2019    T12 kyphoplasty; IVC filter removal    Lithotripsy  2014    Cystoscopy, laser lithotripsy of bladder stone.    Other surgical history  2014    Cysto, RT URS/RPG, laser litho stone extraction    Other surgical history  2014    Flow US    Other surgical history  2015    Flow US    Patient documented not to have experienced any of the following events Right 2014    Procedure: CYSTO, WITH INSERTION OF STENT;  Surgeon: Amrit Morales MD;  Location: [facility]    Patient with preoperative order for iv antibiotic surgical site infect Right 2014    Procedure: CYSTO, WITH INSERTION OF STENT;  Surgeon: Amrit Morales MD;  Location: [facility]    Remove bladder stone,<2.5cm Right 2014    Procedure: LITHOTRIPSY HOLMIUM LASER WITH CYSTOSCOPY;  Surgeon: Amrit Morales MD;  Location: [facility]    X-ray retrograde pyelogram Right 2014    Procedure: CYSTO, WITH INSERTION OF STENT;  Surgeon: Amrit Morales MD;  Location: [facility]                Social History     Socioeconomic History    Marital status:    Tobacco Use    Smoking status: Former     Current packs/day: 0.00     Average packs/day: 0.5 packs/day for 5.0 years (2.5 ttl pk-yrs)     Types: Cigarettes     Start date: 1950     Quit date: 1955     Years since quittin.3    Smokeless tobacco: Never   Vaping Use    Vaping status: Never Used   Substance and Sexual Activity    Alcohol use: No     Alcohol/week: 0.0 standard drinks of alcohol     Comment:  very rarely tuesday polka night- socially    Drug use: No    Sexual activity: Yes     Partners: Female   Other Topics Concern    Caffeine Concern Yes     Comment: 1-2 cups daily    Exercise Yes     Comment: daily     Social Determinants of Health     Food Insecurity: No Food Insecurity (3/30/2024)    Food Insecurity     Food Insecurity: Never true   Transportation Needs: No Transportation Needs (3/30/2024)    Transportation Needs     Lack of Transportation: No   Housing Stability: Low Risk  (3/30/2024)    Housing Stability     Housing Instability: No              Review of Systems    Positive for stated complaint: GI bleed  Other systems are as noted in HPI.  Constitutional and vital signs reviewed.      All other systems reviewed and negative except as noted above.    Physical Exam     ED Triage Vitals [04/16/24 0854]   BP (!) 86/53   Pulse 71   Resp 18   Temp 98 °F (36.7 °C)   Temp src Temporal   SpO2 97 %   O2 Device None (Room air)       Current:BP 97/63   Pulse 76   Temp 98 °F (36.7 °C) (Temporal)   Resp 17   Wt 84.4 kg   SpO2 99%   BMI 30.03 kg/m²         Physical Exam    Constitutional: awake, alert, no sig distress  HENT: mmm, no lesions,  Neck: normal range of motion, no tenderness, supple.  Eyes: PERRL, EOMI, conjunctiva normal, no discharge. Sclera anicteric.  Cardiovascular: rr no murmur  Respiratory: Normal breath sounds, no respiratory distress, no wheezing, no chest tenderness.  GI: Bowel sounds normal, Soft, no tenderness, no masses, no pulsatile masses.  -Light red blood in diaper with blood clots  : No CVA tenderness.  Skin: Warm, dry, no erythema, no rash.  Musculoskeletal: Intact distal pulses, no edema, no tenderness, no cyanosis, no clubbing. Good range of motion in all major joints. No tenderness to palpation or major deformities noted. Back- No tenderness.  Neurologic: Alert & oriented x 3, normal motor function, normal sensory function, no focal deficits noted.  Psych: Calm,  rito, nl affect        ED Course     Labs Reviewed   COMP METABOLIC PANEL (14) - Abnormal; Notable for the following components:       Result Value    Glucose 105 (*)     BUN 41 (*)     Creatinine 1.36 (*)     BUN/CREA Ratio 30.1 (*)     Calcium, Total 8.6 (*)     Calculated Osmolality 300 (*)     eGFR-Cr 48 (*)     All other components within normal limits   PROTHROMBIN TIME (PT) - Abnormal; Notable for the following components:    PT 36.0 (*)     INR 3.35 (*)     All other components within normal limits   PTT, ACTIVATED - Abnormal; Notable for the following components:    PTT 55.5 (*)     All other components within normal limits   CBC W/ DIFFERENTIAL - Abnormal; Notable for the following components:    RBC 3.60 (*)     HGB 10.6 (*)     HCT 34.0 (*)     RDW-SD 52.5 (*)     RDW 15.2 (*)     All other components within normal limits   CBC WITH DIFFERENTIAL WITH PLATELET    Narrative:     The following orders were created for panel order CBC With Differential With Platelet.  Procedure                               Abnormality         Status                     ---------                               -----------         ------                     CBC W/ DIFFERENTIAL[[id number]]          Abnormal            Final result                 Please view results for these tests on the individual orders.   HEMOGLOBIN   HEMOGLOBIN   HEMOGLOBIN   TYPE AND SCREEN    Narrative:     The following orders were created for panel order Type and screen.  Procedure                               Abnormality         Status                     ---------                               -----------         ------                     ABORH (Blood Type)[[id number]]                               Final result               Antibody Screen[[id number]]                                  Final result                 Please view results for these tests on the individual orders.   ABORH (BLOOD TYPE)   ANTIBODY SCREEN   PREPARE RBC   RAINBOW DRAW BLUE    ED/MRSA SCREEN BY PCR-CC          ED Course as of 04/16/24 1137  ------------------------------------------------------------  Time: 04/16 0946  Comment: Labs showed 2 point hemoglobin dropped           MDM      89M, history as above presenting for evaluation of right blood per rectum.  On arrival he is hypotensive, but fluid responsive.  He is showing evidence of ongoing bleeding in the emergency department, obtain CTA to evaluate for active extravasation or potential target for IR intervention.  Clinical suspicion for is for diverticular hemorrhage given painless rectal bleeding and history of diverticular disease  -Hemoglobin down 2 points from previous 12 days ago INR mildly supratherapeutic.  In light of this and hypotension on presentation we will reverse warfarin with Kcentra and vitamin K.  -Discussed with hospitalist and gastroenterology.  Will obtain CTA, and plan for admission to PCCU   -Patient has been fluid responsive in the ER, no sustained hypotension necessitating PRBC transfusion  Admission disposition: 4/16/2024 11:35 AM         I spent a total of 38 minutes of critical care time in obtaining history, performing a physical exam, bedside monitoring of interventions, collecting and interpreting tests and discussion with consultants but not including time spent performing procedures.                                 Medical Decision Making      Disposition and Plan     Clinical Impression:  1. BRBPR (bright red blood per rectum)         Disposition:  Admit  4/16/2024 11:35 am    Follow-up:  No follow-up provider specified.  We recommend that you schedule follow up care with a primary care provider within the next three months to obtain basic health screening including reassessment of your blood pressure.      Medications Prescribed:  Current Discharge Medication List                            Hospital Problems       Present on Admission  Date Reviewed: 3/21/2022            ICD-10-CM Noted POA     BRBPR (bright red blood per rectum) K62.5 4/16/2024 Unknown                     Signed by Aleksandr Morley MD on 4/16/2024 11:37 AM     [facility] Hospitalist H&P   ASSESSMENT / PLAN:   89-year-old male with pertinent history of atrial fibrillation on Coumadin, history of stroke in 2022, history of DVT, hypertension, CKD, dementia, chronic diastolic CHF, who presents to the hospital for evaluation of acute hematochezia.      Acute GI bleed, lower GI bleed likely, given hematochezia  Hypotension, in setting of GI bleed  -in setting of supratherapeutic INR  -prior imaging noted with pandiverticulosis, diverticular bleed high on ddx   -repeat Hb at noon, likely will decline, and trend  -prep 2 units of blood   -reverse INR, discussed with ER, will give vitamin K IV and Kcentra   -I have discussed with IR NP as well to review CTA and will coordinate if needs emergent embolization   -I have updated GI, who will be on consult   -will have ICU attending see incase vitals worsen and shows signs of hemorrhagic shock   -NPO  -place in PCU  -ensure two large bore IVs  -monitor hemodynamics closely   -start IV fluids   -hold anti-platelets, anti coag and nsaids   -hold his chronic PO meds today  Addendum: spoke to IR team. Active extravasation noted on CTA.  Discussions initially to monitor, however later today notified by GI likely pursuing embolization today.      CKD stage 3  -monitor, risk of DEREK given acute bleed     Permanent A fib  -reverse coumadin given acute bleed  -on beta blocker, hold today given hypotension      Dementia  -high risk of delirium  -seen by psychiatry last admission   -will monitor for worsening delirium     Hx of HTN  -hold BP meds today and diuretics      Chronic diastolic CHF  -last ECHO reviewed in care everywhere 6/2023  -EF 55-60%     Fluids: 0.9 saline   Diet: NPO  DVT prophylaxis: hold pharmacologic anticoagualation given acute bleed  Code status: DNR     Disposition:  PCU  Gastroenterology Report of Consultation   Impression:   hematochezia  - patient with history of afib on coumadin presents with acute onset rectal bleeding with clots, this morning hx of diverticulitis- last colonoscopy years ago.   - Ct angiogram completed- results not available at time of assessment  - most likely diverticular bleed in setting of anticoagulation, however cannot rule out other cause  - received reversal on anticoagulation in ER     Recommendations:  - hold anticoagulation  - continue to monitor for overt bleeding  - trend cbc, inr  - Will need to consider colonoscopy pending CT results  Critical Care Consult      Assessment / Plan:  Hypotension - due to ABLA  - IVFs  - vasopressors as needed  Acute blood loss anemia  - trend hemoglobin  - goal hemoglobin >7, platelets >50 and INR <1.5  - s/p Kcentra  - hold all anticoagulants and antiplatelet therapy  Diverticular bleed  - embolization being considered  - per IR and GI  FEN  - NPO  PPx  - SCDs  Dispo  - ICU   Interventional Radiology Report of Consultation   Assessment/Plan:  89yo admitted with GI bleeding.  Plan is for mesenteric angiogram with possible selective arterial embolization.  This was discussed with patient and his daughter Pilar who state understanding and agree to proceed.   4/17  Critical Care Progress Note        Assessment / Plan:  Hypotension - due to lower GI bleed  - IVFs  - vasopressors as needed  Acute blood loss anemia - continued bleeding and downtrending hemoglobin overnight  - goal hemoglobin >7, platelets >50 and INR <1.5  - s/p Kcentra  - hold all anticoagulants and antiplatelet therapy  Diverticular bleed - now s/p embolization with no identifiable source of bleeding. CT A/P with active extravasation.    - endoscopic evaluation vs possible general surgery consult  - per GI  FEN  - NPO  PPx  - SCDs  Dispo  - ICU   Gastroenterology    still with Left sided abdominal tenderness. Labs reviewed.  Completed bowel prep  for planned colonoscopy today. Per nursing passing watery blood with clots per rectum. Last BM at 0630.  D/w Dr Lima . Tap water enema ordered for 11:30 am.  [facility] Hospitalist Progress Note    Assessment/Plan:   89-year-old male with pertinent history of atrial fibrillation on Coumadin, history of stroke in 2022, history of DVT, hypertension, CKD, dementia, chronic diastolic CHF, who presents to the hospital for evaluation of acute hematochezia.      Acute GI bleed, lower GI bleed likely, given hematochezia  Diverticular bleed  Hypotension, in setting of GI bleed  -in setting of supratherapeutic INR  -prior imaging noted with pandiverticulosis, diverticular bleed high on ddx   -reversed INR with vitamin K IV and Kcentra   -monitor hemodynamics closely   -mesenteric angiogram done in IR on 4/16, no active bleeding vessel noted  -plan for colonoscopy today  -NPO  -continue IV fluids   -give one unit of PRBC today   -hold anti-platelets, anti coag and nsaids   -hold his chronic PO meds today     CKD stage 3  -monitor, risk of DEREK given acute bleed     Permanent A fib  -reverse coumadin given acute bleed  -on beta blocker, hold today given hypotension      Dementia  -high risk of delirium  -seen by psychiatry last admission   -will monitor for worsening delirium     Hx of HTN  -hold BP meds today and diuretics      Chronic diastolic CHF  -last ECHO reviewed in care everywhere 6/2023  -EF 55-60%     Fluids: 0.9 saline. Will likely lower rate post colonoscopy if able to start liquids  Diet: NPO  DVT prophylaxis: hold pharmacologic anticoagualation given acute bleed  Code status: DNR     Disposition: PCU    MEDICATIONS ADMINISTERED IN LAST 1 DAY:  Transfuse RBC       Date Action Dose Route User    4/17/2024 1041 New Bag (none) Intravenous Angélica Everett, RN          acetaminophen (Tylenol) tab 650 mg       Date Action Dose Route User    4/16/2024 1819 Given 650 mg Oral Angélica Everett, RN          iohexol  (Omnipaque) 300 MG/ML injection 200 mL       Date Action Dose Route User    4/16/2024 1622 Given 40 mL Intravenous Tatyana Lan RN          lactated ringers infusion       Date Action Dose Route User    4/17/2024 1419 New Bag (none) Intravenous Thea Blackwell CRNA          lidocaine PF (Xylocaine-MPF) 1% injection       Date Action Dose Route User    4/17/2024 1423 Given 50 mg Intravenous Thea Blackwell CRNA          pantoprazole (Protonix) 40 mg in sodium chloride 0.9% PF 10 mL IV push       Date Action Dose Route User    4/17/2024 1054 Given 40 mg Intravenous Angélica Everett RN    4/16/2024 2238 Given 40 mg Intravenous Caitlin Joe RN          polyethylene glycol-electrolyte (Golytely) 236 g oral solution 4,000 mL       Date Action Dose Route User    4/16/2024 2011 Given 4,000 mL Oral Caitlin Joe RN          propofol (Diprivan) 10 MG/ML injection       Date Action Dose Route User    4/17/2024 1423 Given 30 mg Intravenous Thea Blackwell CRNA          propofol (Diprivan) 10 mg/mL infusion premix       Date Action Dose Route User    4/17/2024 1427 Rate/Dose Change 70 mcg/kg/min × 85.9 kg Intravenous Thea Blackwell CRNA    4/17/2024 1423 New Bag 50 mcg/kg/min × 85.9 kg Intravenous Thea Blackwell CRNA          sodium chloride 0.9% infusion       Date Action Dose Route User    4/17/2024 1053 New Bag (none) Intravenous Angélica Everett RN          sodium chloride 0.9 % IV bolus 500 mL       Date Action Dose Route User    4/16/2024 1505 New Bag 500 mL Intravenous Angélica Everett RN            Vitals (last day)       Date/Time Temp Pulse Resp BP SpO2 Weight O2 Device O2 Flow Rate (L/min) [facility]    04/17/24 0300 -- 76 23 88/68 100 % -- None (Room air) -- MJ    04/16/24 1900 -- 77 22 82/52 99 % -- -- -- AS    04/16/24 1000 -- 75 17 81/63 100 % -- -- -- AS    04/16/24 0900 -- 74 -- 77/50 96 % -- None (Room air) -- AK    04/16/24 0854 98 °F (36.7 °C) 71 18 86/53 97 % -- None (Room air) -- AK           CIWA Scores (since admission)       None          Blood Transfusion Record       Product Unit Status Volume Start End            Transfuse RBC       24  [id number]  [id number] Completed 04/17/24 1347 383.33 mL 04/17/24 1041 04/17/24 1345              PLEASE FAX DAYS CERTIFIED AND NEXT REVIEW DATE [phone]

## 2024-04-25 ENCOUNTER — TELEPHONE (OUTPATIENT)
Dept: HEMATOLOGY/ONCOLOGY | Facility: CLINIC | Age: 69
End: 2024-04-25
Payer: MEDICARE

## 2024-04-25 NOTE — TELEPHONE ENCOUNTER
Spoke to pt and notified Dr. Chin out appt 06/19/24 and will need to reschedule to 07/01/24, pt agrees.

## 2024-05-01 ENCOUNTER — PATIENT MESSAGE (OUTPATIENT)
Dept: INTERNAL MEDICINE | Facility: CLINIC | Age: 69
End: 2024-05-01
Payer: MEDICARE

## 2024-05-01 ENCOUNTER — E-VISIT (OUTPATIENT)
Dept: INTERNAL MEDICINE | Facility: CLINIC | Age: 69
End: 2024-05-01
Payer: MEDICARE

## 2024-05-01 DIAGNOSIS — M54.9 BACK PAIN, UNSPECIFIED BACK LOCATION, UNSPECIFIED BACK PAIN LATERALITY, UNSPECIFIED CHRONICITY: Primary | ICD-10-CM

## 2024-05-02 NOTE — TELEPHONE ENCOUNTER
Pt requesting a referral for Kelli San (MRN: [id number] ) to pediatric ENT for continuing pain in left ear

## 2024-05-03 ENCOUNTER — TELEPHONE (OUTPATIENT)
Dept: INTERNAL MEDICINE | Facility: CLINIC | Age: 69
End: 2024-05-03
Payer: MEDICARE

## 2024-05-03 PROCEDURE — 99422 OL DIG E/M SVC 11-20 MIN: CPT | Mod: ,,, | Performed by: STUDENT IN AN ORGANIZED HEALTH CARE EDUCATION/TRAINING PROGRAM

## 2024-05-03 NOTE — PROGRESS NOTES
Patient ID: Anh San is a 68 y.o. female.    Chief Complaint: Back Pain (Entered automatically based on patient selection in Patient Portal.)    The patient initiated a request through [facility] on 5/1/2024 for evaluation and management with a chief complaint of Back Pain (Entered automatically based on patient selection in Patient Portal.)     I evaluated the questionnaire submission on 05/03/2024  .    Ohs Peq Evisit Back Pain    5/1/2024  4:05 PM CDT - Filed by Patient   Do you agree to participate in an E-Visit? Yes   If you have any of the following symptoms, please present to your local ER or call 911: I acknowledge   What is the main issue you would like addressed today? referral for an orthopedist and/or physical therapy   Are you able to take your vital signs? Yes   Systolic Blood Pressure: 137   Diastolic Blood Pressure: 80   Weight: 110   Height: 64   Pulse:    Temperature: 97.2   Respiration rate:    Pulse Oxygen:    Where are you having pain? Lower Back   Does the pain extend into your legs? No   How bad is the pain? The pain is moderate   Did you have an injury that caused the pain? No, I cannot remember an injury   How long has the pain been present? More than 4 weeks   Have you had back pain in the past? Yes, I have many times had pain similiar to this before   Please list any medications or treatments you have used for back pain and indicate if it was effective or not. CBD oil- keeps down inflammation and therefore pain; Ibuprofen-same; Biofreeze -soothes; Heating pad-soothes and helps pain   Do you have a fever? No, I do not have a fever   Do you have any of the following? Areas that are numb or have a strange sensation;  Fatigue;  Loss of appetite;  Weakness   What makes the pain worse? Strenuous activity   What makes the pain better? Hot or cold compress;  Lying in bed;  Over the counter pain medicine   Have you ever been diagnosed with cancer? No   Have you ever been  diagnosed with degenerative disc disease (arthritis of the spine)? No   Have you ever been diagnosed with osteoporosis or any other bone weakness? Yes   Have you ever had surgery on your back or spine? No   What is your usual health status? I am active and can move normally   Provide any additional information you feel is important. My endo doctor prescribed xrays that indicated Spondylolisthesis. I would like a referral for an Orthopedist and/or physical therapist . As an active person, I want to make sure that I am on an exercise program that won't make my current situation worse.   Please attach any relevant images or files          Encounter Diagnosis   Name Primary?    Back pain, unspecified back location, unspecified back pain laterality, unspecified chronicity Yes        Orders Placed This Encounter   Procedures    Ambulatory referral/consult to [facility]     Standing Status:   Future     Standing Expiration Date:   6/3/2025     Referral Priority:   Routine     Referral Type:   Consultation     Referral Reason:   Specialty Services Required     Number of Visits Requested:   1            Follow up if symptoms worsen or fail to improve.      E-Visit Time Tracking:    Day 1 Time (in minutes): 11    Total Time (in minutes): 11

## 2024-05-03 NOTE — TELEPHONE ENCOUNTER
Ok,  sorry to be redundant    Back and spine appt  Also appt with me (ok to override)    Thanks  jl  
Patient said yes she still wants you to be her PCP and don't know how your name was removed from her chart but she didn't request it to be removed.  
Please back and spine appt    Also offer primary care    Does she want my name on there as PCP or did she ask for it to be removed    Thanks,  jl  
When I first spoke to patient she was given scheduling number to schedule back and spine appointment. LVM for patient to call the office back to schedule an appointment, also sent portal message.  
denies pain/discomfort

## 2024-05-09 NOTE — TELEPHONE ENCOUNTER
I don't treat pediatric patients. This message was sent in error - please let the patient know. Thanks COY

## 2024-05-23 ENCOUNTER — LAB VISIT (OUTPATIENT)
Dept: LAB | Facility: OTHER | Age: 69
End: 2024-05-23
Attending: PHYSICIAN ASSISTANT
Payer: MEDICARE

## 2024-05-23 DIAGNOSIS — D50.9 IRON DEFICIENCY ANEMIA, UNSPECIFIED IRON DEFICIENCY ANEMIA TYPE: ICD-10-CM

## 2024-05-23 DIAGNOSIS — D70.0 CONGENITAL NEUTROPENIA: ICD-10-CM

## 2024-05-23 LAB
ALBUMIN SERPL BCP-MCNC: 3.8 G/DL (ref 3.5–5.2)
ALP SERPL-CCNC: 83 U/L (ref 55–135)
ALT SERPL W/O P-5'-P-CCNC: 10 U/L (ref 10–44)
ANION GAP SERPL CALC-SCNC: 5 MMOL/L (ref 8–16)
AST SERPL-CCNC: 16 U/L (ref 10–40)
BASOPHILS # BLD AUTO: 0.04 K/UL (ref 0–0.2)
BASOPHILS NFR BLD: 0.9 % (ref 0–1.9)
BILIRUB SERPL-MCNC: 0.3 MG/DL (ref 0.1–1)
BUN SERPL-MCNC: 13 MG/DL (ref 8–23)
CALCIUM SERPL-MCNC: 8.8 MG/DL (ref 8.7–10.5)
CHLORIDE SERPL-SCNC: 109 MMOL/L (ref 95–110)
CO2 SERPL-SCNC: 24 MMOL/L (ref 23–29)
CREAT SERPL-MCNC: 0.8 MG/DL (ref 0.5–1.4)
DIFFERENTIAL METHOD BLD: ABNORMAL
EOSINOPHIL # BLD AUTO: 0.2 K/UL (ref 0–0.5)
EOSINOPHIL NFR BLD: 3.7 % (ref 0–8)
ERYTHROCYTE [DISTWIDTH] IN BLOOD BY AUTOMATED COUNT: 11.9 % (ref 11.5–14.5)
EST. GFR  (NO RACE VARIABLE): >60 ML/MIN/1.73 M^2
GLUCOSE SERPL-MCNC: 93 MG/DL (ref 70–110)
HCT VFR BLD AUTO: 37.5 % (ref 37–48.5)
HGB BLD-MCNC: 12.9 G/DL (ref 12–16)
IMM GRANULOCYTES # BLD AUTO: 0.01 K/UL (ref 0–0.04)
IMM GRANULOCYTES NFR BLD AUTO: 0.2 % (ref 0–0.5)
LYMPHOCYTES # BLD AUTO: 1.6 K/UL (ref 1–4.8)
LYMPHOCYTES NFR BLD: 38 % (ref 18–48)
MCH RBC QN AUTO: 31.3 PG (ref 27–31)
MCHC RBC AUTO-ENTMCNC: 34.4 G/DL (ref 32–36)
MCV RBC AUTO: 91 FL (ref 82–98)
MONOCYTES # BLD AUTO: 0.5 K/UL (ref 0.3–1)
MONOCYTES NFR BLD: 11.3 % (ref 4–15)
NEUTROPHILS # BLD AUTO: 2 K/UL (ref 1.8–7.7)
NEUTROPHILS NFR BLD: 45.9 % (ref 38–73)
NRBC BLD-RTO: 0 /100 WBC
PATH REV BLD -IMP: NORMAL
PLATELET # BLD AUTO: 282 K/UL (ref 150–450)
PMV BLD AUTO: 10.1 FL (ref 9.2–12.9)
POTASSIUM SERPL-SCNC: 4.1 MMOL/L (ref 3.5–5.1)
PROT SERPL-MCNC: 6.7 G/DL (ref 6–8.4)
RBC # BLD AUTO: 4.12 M/UL (ref 4–5.4)
SODIUM SERPL-SCNC: 138 MMOL/L (ref 136–145)
WBC # BLD AUTO: 4.32 K/UL (ref 3.9–12.7)

## 2024-05-23 PROCEDURE — 85025 COMPLETE CBC W/AUTO DIFF WBC: CPT | Performed by: INTERNAL MEDICINE

## 2024-05-23 PROCEDURE — 85060 BLOOD SMEAR INTERPRETATION: CPT | Mod: ,,, | Performed by: STUDENT IN AN ORGANIZED HEALTH CARE EDUCATION/TRAINING PROGRAM

## 2024-05-23 PROCEDURE — 80053 COMPREHEN METABOLIC PANEL: CPT | Performed by: INTERNAL MEDICINE

## 2024-05-23 PROCEDURE — 36415 COLL VENOUS BLD VENIPUNCTURE: CPT | Performed by: INTERNAL MEDICINE

## 2024-05-24 LAB — PATH REV BLD -IMP: NORMAL

## 2024-05-29 ENCOUNTER — HOSPITAL ENCOUNTER (OUTPATIENT)
Dept: RADIOLOGY | Facility: OTHER | Age: 69
Discharge: HOME | End: 2024-05-29
Attending: STUDENT IN AN ORGANIZED HEALTH CARE EDUCATION/TRAINING PROGRAM
Payer: MEDICARE

## 2024-05-29 ENCOUNTER — OFFICE VISIT (OUTPATIENT)
Dept: SPINE | Facility: CLINIC | Age: 69
End: 2024-05-29
Payer: MEDICARE

## 2024-05-29 VITALS
HEART RATE: 64 BPM | HEIGHT: 60 IN | RESPIRATION RATE: 12 BRPM | OXYGEN SATURATION: 100 % | BODY MASS INDEX: 21.6 KG/M2 | DIASTOLIC BLOOD PRESSURE: 89 MMHG | WEIGHT: 110 LBS | SYSTOLIC BLOOD PRESSURE: 132 MMHG

## 2024-05-29 DIAGNOSIS — M54.2 NECK PAIN: ICD-10-CM

## 2024-05-29 DIAGNOSIS — M54.2 NECK PAIN: Primary | ICD-10-CM

## 2024-05-29 DIAGNOSIS — M51.36 DDD (DEGENERATIVE DISC DISEASE), LUMBAR: ICD-10-CM

## 2024-05-29 DIAGNOSIS — M47.816 SPONDYLOSIS OF LUMBAR REGION WITHOUT MYELOPATHY OR RADICULOPATHY: ICD-10-CM

## 2024-05-29 DIAGNOSIS — M54.12 CERVICAL RADICULOPATHY: ICD-10-CM

## 2024-05-29 PROCEDURE — 99204 OFFICE O/P NEW MOD 45 MIN: CPT | Mod: S$GLB,,, | Performed by: STUDENT IN AN ORGANIZED HEALTH CARE EDUCATION/TRAINING PROGRAM

## 2024-05-29 PROCEDURE — 1159F MED LIST DOCD IN RCRD: CPT | Mod: CPTII,S$GLB,, | Performed by: STUDENT IN AN ORGANIZED HEALTH CARE EDUCATION/TRAINING PROGRAM

## 2024-05-29 PROCEDURE — 3288F FALL RISK ASSESSMENT DOCD: CPT | Mod: CPTII,S$GLB,, | Performed by: STUDENT IN AN ORGANIZED HEALTH CARE EDUCATION/TRAINING PROGRAM

## 2024-05-29 PROCEDURE — 99999 PR PBB SHADOW E&M-EST. PATIENT-LVL IV: CPT | Mod: PBBFAC,,, | Performed by: STUDENT IN AN ORGANIZED HEALTH CARE EDUCATION/TRAINING PROGRAM

## 2024-05-29 PROCEDURE — 1101F PT FALLS ASSESS-DOCD LE1/YR: CPT | Mod: CPTII,S$GLB,, | Performed by: STUDENT IN AN ORGANIZED HEALTH CARE EDUCATION/TRAINING PROGRAM

## 2024-05-29 PROCEDURE — 1160F RVW MEDS BY RX/DR IN RCRD: CPT | Mod: CPTII,S$GLB,, | Performed by: STUDENT IN AN ORGANIZED HEALTH CARE EDUCATION/TRAINING PROGRAM

## 2024-05-29 PROCEDURE — 1126F AMNT PAIN NOTED NONE PRSNT: CPT | Mod: CPTII,S$GLB,, | Performed by: STUDENT IN AN ORGANIZED HEALTH CARE EDUCATION/TRAINING PROGRAM

## 2024-05-29 PROCEDURE — 3008F BODY MASS INDEX DOCD: CPT | Mod: CPTII,S$GLB,, | Performed by: STUDENT IN AN ORGANIZED HEALTH CARE EDUCATION/TRAINING PROGRAM

## 2024-05-29 PROCEDURE — 3075F SYST BP GE 130 - 139MM HG: CPT | Mod: CPTII,S$GLB,, | Performed by: STUDENT IN AN ORGANIZED HEALTH CARE EDUCATION/TRAINING PROGRAM

## 2024-05-29 PROCEDURE — 3079F DIAST BP 80-89 MM HG: CPT | Mod: CPTII,S$GLB,, | Performed by: STUDENT IN AN ORGANIZED HEALTH CARE EDUCATION/TRAINING PROGRAM
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
## 2024-05-30 ENCOUNTER — PATIENT MESSAGE (OUTPATIENT)
Dept: ADMINISTRATIVE | Facility: HOSPITAL | Age: 69
End: 2024-05-30
Payer: MEDICARE

## 2024-05-31 ENCOUNTER — HOSPITAL ENCOUNTER (OUTPATIENT)
Dept: RADIOLOGY | Facility: OTHER | Age: 69
Discharge: HOME | End: 2024-05-31
Attending: STUDENT IN AN ORGANIZED HEALTH CARE EDUCATION/TRAINING PROGRAM
Payer: MEDICARE

## 2024-05-31 PROCEDURE — 72052 X-RAY EXAM NECK SPINE 6/>VWS: CPT | Mod: TC,FY

## 2024-05-31 PROCEDURE — 72052 X-RAY EXAM NECK SPINE 6/>VWS: CPT | Mod: 26,,, | Performed by: RADIOLOGY

## 2024-06-04 ENCOUNTER — CLINICAL SUPPORT (OUTPATIENT)
Dept: REHABILITATION | Facility: OTHER | Age: 69
End: 2024-06-04
Attending: STUDENT IN AN ORGANIZED HEALTH CARE EDUCATION/TRAINING PROGRAM
Payer: MEDICARE

## 2024-06-04 DIAGNOSIS — M54.12 CERVICAL RADICULOPATHY: ICD-10-CM

## 2024-06-04 DIAGNOSIS — M54.2 CHRONIC NECK AND BACK PAIN: Primary | ICD-10-CM

## 2024-06-04 DIAGNOSIS — G89.29 CHRONIC NECK AND BACK PAIN: Primary | ICD-10-CM

## 2024-06-04 DIAGNOSIS — R53.1 DECREASED STRENGTH, ENDURANCE, AND MOBILITY: ICD-10-CM

## 2024-06-04 DIAGNOSIS — Z74.09 DECREASED STRENGTH, ENDURANCE, AND MOBILITY: ICD-10-CM

## 2024-06-04 DIAGNOSIS — R68.89 DECREASED STRENGTH, ENDURANCE, AND MOBILITY: ICD-10-CM

## 2024-06-04 DIAGNOSIS — R29.3 POSTURE IMBALANCE: ICD-10-CM

## 2024-06-04 DIAGNOSIS — M51.36 DDD (DEGENERATIVE DISC DISEASE), LUMBAR: ICD-10-CM

## 2024-06-04 DIAGNOSIS — M54.9 CHRONIC NECK AND BACK PAIN: Primary | ICD-10-CM

## 2024-06-04 DIAGNOSIS — M54.2 NECK PAIN: ICD-10-CM

## 2024-06-04 PROCEDURE — 97530 THERAPEUTIC ACTIVITIES: CPT | Mod: PN

## 2024-06-04 PROCEDURE — 97163 PT EVAL HIGH COMPLEX 45 MIN: CPT | Mod: PN

## 2024-06-05 ENCOUNTER — PATIENT MESSAGE (OUTPATIENT)
Dept: INTERNAL MEDICINE | Facility: CLINIC | Age: 69
End: 2024-06-05
Payer: MEDICARE

## 2024-06-06 ENCOUNTER — TELEPHONE (OUTPATIENT)
Dept: ENDOCRINOLOGY | Facility: CLINIC | Age: 69
End: 2024-06-06
Payer: MEDICARE

## 2024-06-06 NOTE — TELEPHONE ENCOUNTER
----- Message from Summer King sent at 6/6/2024 10:58 AM CDT -----  .Type: Appointment Request     Caller is requesting appointment    No Solution Found When Scheduling: PLEASE REACH OUT TO PT ON SCHEDULING REFERRAL IN EPIC     Best Call Back Number:  [phone]     Additional Information: THANK YOU

## 2024-06-07 PROBLEM — R53.1 DECREASED STRENGTH, ENDURANCE, AND MOBILITY: Status: ACTIVE | Noted: 2024-06-07

## 2024-06-07 PROBLEM — R29.3 POSTURE IMBALANCE: Status: ACTIVE | Noted: 2024-06-07

## 2024-06-07 PROBLEM — M54.2 CHRONIC NECK AND BACK PAIN: Status: ACTIVE | Noted: 2024-06-07

## 2024-06-07 PROBLEM — G89.29 CHRONIC NECK AND BACK PAIN: Status: ACTIVE | Noted: 2024-06-07

## 2024-06-07 PROBLEM — Z74.09 DECREASED STRENGTH, ENDURANCE, AND MOBILITY: Status: ACTIVE | Noted: 2024-06-07

## 2024-06-07 PROBLEM — R68.89 DECREASED STRENGTH, ENDURANCE, AND MOBILITY: Status: ACTIVE | Noted: 2024-06-07

## 2024-06-07 PROBLEM — M54.9 CHRONIC NECK AND BACK PAIN: Status: ACTIVE | Noted: 2024-06-07
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
## 2024-06-13 ENCOUNTER — PATIENT MESSAGE (OUTPATIENT)
Dept: ENDOCRINOLOGY | Facility: CLINIC | Age: 69
End: 2024-06-13
Payer: MEDICARE

## 2024-06-18 ENCOUNTER — CLINICAL SUPPORT (OUTPATIENT)
Dept: REHABILITATION | Facility: OTHER | Age: 69
End: 2024-06-18
Attending: STUDENT IN AN ORGANIZED HEALTH CARE EDUCATION/TRAINING PROGRAM
Payer: MEDICARE

## 2024-06-18 DIAGNOSIS — M54.9 CHRONIC NECK AND BACK PAIN: Primary | ICD-10-CM

## 2024-06-18 DIAGNOSIS — G89.29 CHRONIC NECK AND BACK PAIN: Primary | ICD-10-CM

## 2024-06-18 DIAGNOSIS — R29.3 POSTURE IMBALANCE: ICD-10-CM

## 2024-06-18 DIAGNOSIS — M54.2 CHRONIC NECK AND BACK PAIN: Primary | ICD-10-CM

## 2024-06-18 DIAGNOSIS — R53.1 DECREASED STRENGTH, ENDURANCE, AND MOBILITY: ICD-10-CM

## 2024-06-18 DIAGNOSIS — R68.89 DECREASED STRENGTH, ENDURANCE, AND MOBILITY: ICD-10-CM

## 2024-06-18 DIAGNOSIS — Z74.09 DECREASED STRENGTH, ENDURANCE, AND MOBILITY: ICD-10-CM

## 2024-06-18 PROCEDURE — 97530 THERAPEUTIC ACTIVITIES: CPT | Mod: PN

## 2024-06-18 PROCEDURE — 97112 NEUROMUSCULAR REEDUCATION: CPT | Mod: PN

## 2024-06-18 NOTE — PROGRESS NOTES
[facility]   Physical Therapy Treatment Note      Name: Anh Morrow [facility]  Clinic Number: [id number]    Therapy Diagnosis:   Encounter Diagnoses   Name Primary?    Chronic neck and back pain Yes    Decreased strength, endurance, and mobility     Posture imbalance      Physician: Tessa Pan MD    Visit Date: 6/18/2024    Physician Orders: PT Eval and Treat   Medical Diagnosis from Referral: M54.2 (ICD-10-CM) - Neck pain M51.36 (ICD-10-CM) - DDD (degenerative disc disease), lumbar M54.12 (ICD-10-CM) - Cervical radiculopathy  Evaluation Date: 6/4/2024  Authorization Period Expiration: 05/29/2025  Plan of Care Expiration: 9/4/2024  Progress Note Due: 7/4/2024  Date of Surgery: n/a  Visit # / Visits authorized: 2/ 1   FOTO: 1/ 3     Precautions: Standard, Osteoporosis (lumbar spine, Lázaro hip)      Time In: 3:45 pm   Time Out: 4:30 pm  Total Billable Time: 45 minutes    PTA Visit #: 0/5       Subjective     Patient reports: feeling good today. Has noticed that she is able to get up and move in the AM without c/o LBP. Able to climb on the elliptical for 2.5 mi today withotu c/o pain. C/c today is more neck and shoulder pain vs LBP.  She was compliant with home exercise program.  Response to previous treatment: good  Functional change: reduced c/o LBP    Pain: 2/10  Location: lázaro neck, down BUE to hands    Pain: 1/10  Location: lázaro low back (central)    Objective      Objective Measures updated at progress report unless specified.     Treatment     Anh received the treatments listed below:      therapeutic exercises to develop strength, endurance, ROM, flexibility, posture, and core stabilization for 00 minutes including:  None today     manual therapy techniques: Joint mobilizations, Myofacial release, and Soft tissue Mobilization were applied to the: CSP/TSP for 00 minutes, including:  None today     neuromuscular re-education activities to improve: Balance, Coordination,  "Kinesthetic, Sense, Proprioception, and Posture for 25 minutes. The following activities were included:  Open books x 10 B  Supine hailee ER + scap squeeze x 2x10 x BTB  Supine hailee Abd + scap squeeze x 2x10 x GTB    TA activation x 10 x10" holds  TA + BKFO x 3x10 B  TA + heel slides x 2x10 B    Prone Is (palms down) x 1x15 x 5" holds   Prone Ts (thumb up) x 1x15 x 5" holds    Add nv: prone Y, prone hip ext, prone donkey kicks     therapeutic activities to improve functional performance for 20 minutes, including:  Pt edu on updated HEP for foundation of trunk, scapular strength for pelvic and cervicothoracic stabilization with ADLs. Edu on importance of compliance and consistency daily as well as frequency/duration for long term maintenance vs current stabilization for pain modulation:::   Supine hailee ER [id number]   Supine hailee Abd [id number]   TA + BKFO x4x10   TA + heel slide x4x10   Prone I, T, Y x3x10 x 5" holds   Prone hip ext x3x10 x 5" holds   Prone donkey kicks x3x10 x 5" holds          Patient Education and Home Exercises       Education provided:   - Pt edu on dx, pathogenesis re: osteoporosis and exercise protocols (visual assistance required with anatomy pictures), prognosis for spondylolisthesis and abnormal posture, and PT POC - see below. Edu on exercise modifications and progressions to cont strength and conditioning in her free time - pool exercises, yoga, pilates, light strength training.  - Patient received education regarding proper posture and body mechanics. Benito gann tried, recommended, and purchase information was provided. Heavy emphasis on upright posture while sitting to improve spinal alignment, restore lumbar curvature, and reduce stress/strain on cervical spine. Continued to recommend pt perform HEP intermittently throughout the day to reduce c/o stiffness and pain, particularly cervical retractions while sitting in car with TTC from headrest. Pt also educated on use of modalities prn to " reduce c/o pain and dysfunction. Anh demo good understanding of the education provided. Anh  demonstrated good return demonstration of activities.    Written Home Exercises Provided: Patient instructed to cont prior HEP. Exercises were reviewed and Anh was able to demonstrate them prior to the end of the session.  Anh demonstrated good  understanding of the education provided. See Electronic Medical Record under Patient Instructions for exercises provided during therapy sessions    Assessment     Progressed scapular and TA activation series for trunk and pelvic stabilization. Good tolerance with excellent TA activation in supine, intermittent TC/VC required for scapular placement in prone. Updated HEP to reflect stabilization program, to be performed while pt is out of the country beginning in mid-July.    Anh Is progressing well towards her goals.   Patient prognosis is Good.     Patient will continue to benefit from skilled outpatient physical therapy to address the deficits listed in the problem list box on initial evaluation, provide pt/family education and to maximize pt's level of independence in the home and community environment.     Patient's spiritual, cultural and educational needs considered and pt agreeable to plan of care and goals.     Anticipated barriers to physical therapy: standard, chronicity of sx, pt leaving to go out of country on July 14 x 1 mo    Goals: updated 6/18/2024   Short Term Goals (4 Weeks):   1. Pt will report 20% reduction in pain of the lumbar spine for ease with ADL's (not met, progressing)  2. Pt will report 20% reduction in pain of the cervical spine and hailee UE for ease with ADL's (not met, progressing)  3. PT will demonstrate improved trunk and hip strength by a half grade in for ease with squatting/lifting activities. (not met, progressing)  4. PT will demonstrate improved scapular strength by a half grade in for ease with OH lifting activities. (not met,  progressing)  5. Pt will demonstrate improved cervical spine ROM in all directions by 5 deg for ease with looking over shoulder while driving. (not met, progressing)   6. Pt to demonstrate improved postural awareness and endurance by being able to self correct posture 50% of the time. (not met, progressing)     Long Term Goals (12 Weeks):   1. Pt will report being independent with HEP for maintenance of improvements gained during therapy sessions (not met, progressing)  2. PT will report 50% reduction of pain of the back and neck for ease with dressing and grooming activities.  (not met, progressing)  3. Pt will demonstrate trunk and hip strength to >=4+/5 without the provocation of pain for ease with household chores (not met, progressing)  4. Pt will demonstrate improved scapular strength to WFL to improve overall scapulothoracic and cervicothoracic stabilization with prolonged or repetitive OH activities w/o increased sx. (not met, progressing)  5. Pt will demonstrate appropriate upright posture without external cueing for ease with work related activities.  (not met, progressing)  6. Pt to demonstrate good return technique with HEP for independence and maintenance of strength and conditioning program for osteoporosis prevention. (not met, progressing)    Plan     Cont with POC for scapular and TA strength, stabilization. Progress HEP prn as pt will be leaving to go out of the country as of July 14, 2024, for 1 month.    Zoe Stanley, PT

## 2024-07-01 ENCOUNTER — OFFICE VISIT (OUTPATIENT)
Dept: HEMATOLOGY/ONCOLOGY | Facility: CLINIC | Age: 69
End: 2024-07-01
Payer: MEDICARE

## 2024-07-01 DIAGNOSIS — D70.8 OTHER NEUTROPENIA: ICD-10-CM

## 2024-07-01 DIAGNOSIS — M81.6 LOCALIZED OSTEOPOROSIS WITHOUT CURRENT PATHOLOGICAL FRACTURE: Primary | ICD-10-CM

## 2024-07-01 PROCEDURE — 99214 OFFICE O/P EST MOD 30 MIN: CPT | Mod: 95,,, | Performed by: INTERNAL MEDICINE

## 2024-07-02 NOTE — PROGRESS NOTES
Subjective:    The patient location is: home  Visit type: Virtual visit with synchronous audio and video  Face-to-face or time spent with patient on the encounter:25 min  Total time spent on and for  this encounter which includes non face-to-face time preparing to see patient, review of tests, obtaining and or reviewing separately obtained records documenting clinical information in the electronic or other health records, independently interpreting results which is not separately reported ,and communicating results to the patient/family/caregiver and in care coordination and treatment planning/communicating with pharmacy for prescriptions/addressing social needs/arranging follow-up and or referrals :30 min    Each patient I provide medical services by telemedicine is:  (1) informed of the relationship between the physician and patient and the respective role of any other health care provider with respect to management of the patient; and (2) notified that he or she may decline to receive medical services by telemedicine and may withdraw from such care at any time.  This is a video visit therefore some elements of the physical exam such as vital signs, heart sounds are breath sounds are not included and may be included if found in recent clinic notes of other providers assessing same patient. Any symptoms or signs that were visualized were stated by the patient may be included in this note.    Patient ID: Anh San is a 68 y.o. female.    Chief Complaint: neutropenia  HPI  For past several years in her 20's she had bone marrow bx, saw hematoologist6 5 years ago  Has osteoporosis. Believes in homeopathic remedies  Review of Systems   Constitutional:  Negative for appetite change and unexpected weight change.   HENT:  Negative for mouth sores.    Eyes:  Negative for visual disturbance.   Respiratory:  Negative for cough and shortness of breath.    Cardiovascular:  Negative for chest pain.    Gastrointestinal:  Negative for abdominal pain and diarrhea.   Genitourinary:  Negative for frequency.   Musculoskeletal:  Positive for back pain.   Skin:  Negative for rash.   Neurological:  Positive for headaches.   Hematological:  Negative for adenopathy.   Psychiatric/Behavioral:  The patient is nervous/anxious.        She has been seeking endocrine follow-up due to parathyroid issues. PCP managing osteoporosis      No past medical history on file.  Past Surgical History:   Procedure Laterality Date    ADENOIDECTOMY      bladder prolapse surgery      carpel tunnel repair       SECTION      x1 1978    FRACTURE SURGERY      HERNIA REPAIR      TONSILLECTOMY       Family History   Problem Relation Name Age of Onset    Multiple sclerosis Mother          late onset    Hypertension Father      Heart disease Sister      Hypertension Sister      Coronary artery disease Sister          stents    Heart attack Sister      Heart attacks under age 50 Sister      HIV Brother      Depression Son Simeon     Anxiety disorder Son Simeon     No Known Problems Son Maurice     Anxiety disorder Son Alfred     No Known Problems Son Nato     Stroke Maternal Grandmother      Colon cancer Neg Hx      Breast cancer Neg Hx      Ovarian cancer Neg Hx        Social History     Socioeconomic History    Marital status: Single   Tobacco Use    Smoking status: Never    Smokeless tobacco: Never   Substance and Sexual Activity    Alcohol use: Not Currently    Drug use: Never    Sexual activity: Not Currently     Partners: Male     Social Determinants of Health     Financial Resource Strain: Low Risk  (2024)    Overall Financial Resource Strain (CARDIA)     Difficulty of Paying Living Expenses: Not hard at all   Food Insecurity: No Food Insecurity (2024)    Hunger Vital Sign     Worried About Running Out of Food in the Last Year: Never true     Ran Out of Food in the Last Year: Never true   Transportation Needs: No Transportation Needs  (10/12/2022)    PRAPARE - Transportation     Lack of Transportation (Medical): No     Lack of Transportation (Non-Medical): No   Physical Activity: Sufficiently Active (7/1/2024)    Exercise Vital Sign     Days of Exercise per Week: 6 days     Minutes of Exercise per Session: 30 min   Stress: Stress Concern Present (7/1/2024)    [employer] - Occupational Stress Questionnaire     Feeling of Stress : To some extent   Housing Stability: Unknown (10/12/2022)    Housing Stability Vital Sign     Unable to Pay for Housing in the Last Year: No     Unstable Housing in the Last Year: No     Review of patient's allergies indicates:  No Known Allergies    Current Outpatient Medications:     pantoprazole (PROTONIX) 40 MG tablet, Take 1 tablet (40 mg total) by mouth once daily., Disp: 28 tablet, Rfl: 0    Physical Exam    VITAL SIGNS:  as above   GENERAL: appears well-built, well-nourished.  No anxiety, no agitation, and in no distress.  Patient is awake, alert, oriented and cooperative.  HEENT:  Showed no congestion. Trachea is central no obvious icterus or pallor noted no hoarseness. no obvious JVD   NECK:  Supple.  No JVD. No obvious cervical submental or supraclavicular adenopathy.  RS:the visualized portion of  Chest expands well. chest appears symmetric, no audible wheezes.  No dyspnea recognized  ABDOMEN:  abdomen appears undistended.  EXTREMITIES:  Without edema.  NEUROLOGICAL:  The patient is appropriate, higher functions are normal.  No  obvious neurological deficits.  normal judgement normal thought content  No confusion, no speech impediment. Cranial nerves are intact and show no deficit. No gross motor deficits noted   SKIN MUSCULOSKELETAL: no joint or skeletal deformity, no clubbing of nails.  No visible rash ecchymosis or petechiae   Lab Results   Component Value Date    WBC 4.32 05/23/2024    HGB 12.9 05/23/2024    HCT 37.5 05/23/2024    MCV 91 05/23/2024     05/23/2024  "      BMP  Lab Results   Component Value Date     05/23/2024    K 4.1 05/23/2024     05/23/2024    CO2 24 05/23/2024    BUN 13 05/23/2024    CREATININE 0.8 05/23/2024    CALCIUM 9.2 05/31/2024    ANIONGAP 5 (L) 05/23/2024    ESTGFRAFRICA >60 03/21/2022    EGFRNONAA >60 03/21/2022     No results found for: "IRON", "TIBC", "FERRITIN", "SATURATEDIRO"      Patient Active Problem List   Diagnosis    Localized osteoporosis without current pathological fracture    Neutropenia    Arm mass, left    Chronic neck and back pain    Decreased strength, endurance, and mobility    Posture imbalance        Assessment and Plan   Neutropenia: severql years, had bone marrow bx at 28 years and saw hematology 5 years ago, believe this is bengn cyclic and non pathologic, recent visits white count has normalized patient may continue follow-up with her primary care    Osteoporosis: pt exercises and attmept natural remedies, recommend dw pcp for osteoporosis treatment and calcium for bone building.  She also has parathyroid issues for which she will be seeing Endocrine.  Return to my clinic as needed.  Advised patient that we can also treat the osteoporosis with bisphosphonates here in the event endocrine workup and follow-up is delayed and not satisfactory    Advance Care Planning     Date: 06/21/2023    Power of   I initiated the process of voluntary advance care planning today by mailing out acp docs to pt to review, discuss , complte and return to file on her behalf      "

## 2024-07-10 ENCOUNTER — HOSPITAL ENCOUNTER (OUTPATIENT)
Dept: RADIOLOGY | Facility: OTHER | Age: 69
Discharge: HOME | End: 2024-07-10
Attending: STUDENT IN AN ORGANIZED HEALTH CARE EDUCATION/TRAINING PROGRAM
Payer: MEDICARE

## 2024-07-10 DIAGNOSIS — Z12.31 ENCOUNTER FOR SCREENING MAMMOGRAM FOR BREAST CANCER: ICD-10-CM

## 2024-07-10 PROCEDURE — 77067 SCR MAMMO BI INCL CAD: CPT | Mod: TC

## 2024-08-28 ENCOUNTER — OFFICE VISIT (OUTPATIENT)
Facility: CLINIC | Age: 69
End: 2024-08-28
Payer: MEDICARE

## 2024-08-28 DIAGNOSIS — E21.3 HYPERPARATHYROIDISM: ICD-10-CM

## 2024-08-28 DIAGNOSIS — M81.6 LOCALIZED OSTEOPOROSIS WITHOUT CURRENT PATHOLOGICAL FRACTURE: Primary | ICD-10-CM

## 2024-08-28 RX ORDER — ESTRADIOL 0.1 MG/G
2 CREAM VAGINAL
COMMUNITY
Start: 2024-04-30

## 2024-08-28 RX ORDER — CYANOCOBALAMIN 1000 UG/ML
1 INJECTION, SOLUTION INTRAMUSCULAR; SUBCUTANEOUS
COMMUNITY

## 2024-08-28 RX ORDER — ABALOPARATIDE 2000 UG/ML
80 INJECTION, SOLUTION SUBCUTANEOUS
COMMUNITY
Start: 2024-04-11

## 2024-08-28 RX ORDER — CODEINE PHOSPHATE AND GUAIFENESIN 10; 100 MG/5ML; MG/5ML
10 SOLUTION ORAL
COMMUNITY

## 2024-08-28 RX ORDER — ERGOCALCIFEROL 1.25 MG/1
1 CAPSULE ORAL
COMMUNITY
Start: 2024-06-13

## 2024-08-28 NOTE — PROGRESS NOTES
ENDOCRINOLOGY NEW PATIENT VISIT: 08/28/2024    The patient location is: Home  The chief complaint leading to consultation is: Osteoporosis and hyperparathyroidism    Visit type: audiovisual    Face to Face time with patient: 45  50 minutes of total time spent on the encounter, which includes face to face time and non-face to face time preparing to see the patient (eg, review of tests), Obtaining and/or reviewing separately obtained history, Documenting clinical information in the electronic or other health record, Independently interpreting results (not separately reported) and communicating results to the patient/family/caregiver, or Care coordination (not separately reported).     Each patient to whom he or she provides medical services by telemedicine is:  (1) informed of the relationship between the physician and patient and the respective role of any other health care provider with respect to management of the patient; and (2) notified that he or she may decline to receive medical services by telemedicine and may withdraw from such care at any time.      Subjective:      Patient ID: Anh San is a 68 y.o. female.    Chief Complaint:  Osteoporosis (Discuss high pth numbers) and Hyperparathyroidism    History of Present Illness  Anh San presents for a second opinion regarding her osteoporosis and elevated parathyroid labs.  She has been followed by Dr. Berry with the last being April 2024.  Treatments started by her since that time have included Tymlos for her osteoporosis and also suggestions to take vitamin D to potentially help her PTH levels.  She is wanting a second opinion and is seeing me for the first time today.        Relevant History:  Last visit with Dr. Berry was April 2024, prior to that October 2023.  She has a known history of osteoporosis on DXA since 2020.  No concerns for fragility fractures but T-scores and FRAX have put her in the high risk category prompting anabolic  treatment.  Had extensive testing performed for the elevated PTH levels which included Vitamin D assessments, serum calcium testing, celiac antibody screening and 24 hour urine calcium testing.  She feels that on urine calcium testing she might have not collected every sample of urine during the 24 hour lab collection, possibly under estimating her total calcium excretion.  Has known vegetarian lifestyle (since 15 years old) with some calcium supplementation and leafy greens but minimal dairy.  Remains active with exercise and has had some falls recently in setting of traveling out of the country but has caught herself well without any major injury or fractures.  Started Tymlos on June 11th 2024, after her two prior lab checks for PTH levels were noted to be elevated.  Aware of Evenity as an option but was concerned with heart complications and her family history of cardiac disease so preferred PTH analog.  Prior alcohol use was more regular but now only has occasional glass of wine or glass of scotch.  No history of bypass surgery.  Side effects have been variable (see below).  She has read up on normocalcemic hyperparathyroidism but is now aware of risks of hypoparathyroidism should this diagnosis be suspected and surgery be pursued (also has no desire for invasive treatments or surgery).  Additional history of hepatitis C which she states was treated and cured in the past.  No known family history of osteoporosis.        Regarding Hyperparathyroidism:    - Notable for elevated PTH since Jan 2024  - In setting of low normal Vit D, normal serum calcium, normal urine calcium (possibly lower)  - Also normal celiac testing  - Calcium in diet variable and supplements have been a plant based vegan calcium     Latest Reference Range & Units 02/17/23 15:33 09/18/23 14:47 01/31/24 11:45 05/23/24 16:45 05/31/24 13:33   Calcium 8.7 - 10.5 mg/dL 9.1 9.3 9.1 8.8 9.2   Phosphorus Level 2.7 - 4.5 mg/dL   3.6     Albumin 3.5 -  5.2 g/dL 4.2 4.0  3.8    Vitamin D 30 - 96 ng/mL   34  35   PTH 9.0 - 77.0 pg/mL   90.7 (H)  114.6 (H)   (H): Data is abnormally high       Latest Reference Range & Units 02/07/24 14:23   CREATININE, URINE (SEND OUT) 15.0 - 325.0 mg/dL 43.7   Calcium, Urine 0.0 - 15.0 mg/dL 7.9   Calcium, Timed Urine 4 - 12 mg/Hr 6   Urine Calcium Absolute  mg/Spec 142   Urine Creatinine Absolute mg/Spec 786.6   Creatinine, Timed Urine 40.0 - 75.0 mg/Hr 32.8 (L)   (L): Data is abnormally low       Latest Reference Range & Units 01/31/24 11:45   TTG IgA <7.0 U/mL 1.0   TTG IgG <7.0 U/mL 0.9     - Taking lithium or hydrochlorothiazide: no     - Surgical indications in PHPT were confirmed:  No   Yes  [x]    []  Nephrolithiasis  [x]    []  Hypercalcuria (may have collected this abnormally)  [x]    []  Impaired renal function  []    [x]  Osteoporosis   [x]    []  Serum calcium level greater than or equal to 11.5    - Current symptoms:    No   Yes  [x]    []  Neuro symptoms  [x]    []  Depression  []    [x]  Mental Fog  [x]    []  Anxiety  []    [x]  Polyuria  [x]    []  Polydipsia  [x]    []  Anorexia, nausea, vomiting (did have nausea early on)  [x]    []  Constipation  [x]    []  H/O Pancreatitis  [x]    []  GERD with frequent tums  []    [x]  Muscle weakness  []    [x]  Bone pain  []    [x]  Fatigue      Regarding Osteoporosis:    - Most recent DEXA: 05/29/2023    FINDINGS:  BMD lumbar spine 0.781 g/cm squared.  T-score -3.5, Z-score -1.4.  BMD left femoral neck is 0.629 g/cm squared.  T-score -2.9, Z-score -1.1.  BMD right femoral neck is 0.637 g/cm squared.  T-score -2.9, Z-score -1.0.     Impression:  Osteoporosis lumbar spine right and left hip.  Ten year fracture probability:  Major osteoporotic 15.5%, hip 4.7%.     Latest Reference Range & Units 01/31/24 11:45   C Telopeptide (CTX), Serum pg/mL 652       - Fracture history  - Age 15: Fracture of left wrist when falling off a horse.   - Early 60's:  Fracture small bone in her  palm she recalls     - Current osteoporosis medication: abaloparatide (Tymlos) since 2024.  Dosing daily.      - Calcium intake:  Vegan calcium pill being taken (plant fusion).   Is on a Calcium dose of approx 700 mg.  Will be adjusting to 1000 mg soon.   Has minimal amount of dairy products but almond mild and vegetables including leafy greens.    - Vit D3 intake:  Vitamin D3 drops used in past    Other vitamins/supplements: Tumeric, ashwaganda, santos    - Post-menopausal age: Late 40's.    - Repro Hx:     - Has 2 cats and dog but in general they are not a tripping hazard for her.    - Pertinent factors:  No   Yes  [x]    []  Tobacco use  []    [x]  Alcohol use  [x]    []  Current diarrhea or history of malabsorption (Celiac disease)  [x]    []  Thyroid disease  [x]    []  Anemia  [x]    []  Kidney stones  []    [x]  Hyperparathyroidism  []    [x]  High risk medications include: Occasional use of Protonix.    []    [x]  Recent falls (few weeks ago when traveling in [facility] and running)  [x]    []  Height loss greater than 2 inches  []    [x]  Tolerating weight-bearing exercise    - Patient uses ambulatory devices: none  - Sense of balance: Good    - Significant history or physical findings:  No   Yes  [x]    []  Marfanoid body habitus  [x]    []  Hyper flexible  [x]    []  Early tooth loss as a child  [x]    []  Easy bruising  []    [x]  Trouble healing wounds  [x]    []  Estrogen replacement therapy after menopause  [x]    []  Mother or father with hip/spine fracture or diagnosed with osteoporosis  [x]    []  History of malignancy involving bone (such as metastasis)  [x]    []  Prior radiation treatment  [x]    []  Dental work planned        ROS:   As above    Objective:     There were no vitals taken for this visit.  BP Readings from Last 3 Encounters:   24 132/89   10/18/23 (!) 138/96   23 132/74     Wt Readings from Last 1 Encounters:   24 1304 49.9 kg (110 lb)     There is no  "height or weight on file to calculate BMI.    Physical Exam  Constitutional:       Appearance: Normal appearance.   Neurological:      Mental Status: She is alert.   Psychiatric:         Mood and Affect: Mood normal.         Behavior: Behavior normal.        Lab Review:   No results found for: "HGBA1C"  Lab Results   Component Value Date    CHOL 199 03/21/2022    HDL 83 (H) 03/21/2022    LDLCALC 105.0 03/21/2022    TRIG 55 03/21/2022    CHOLHDL 41.7 03/21/2022     Lab Results   Component Value Date     05/23/2024    K 4.1 05/23/2024     05/23/2024    CO2 24 05/23/2024    GLU 93 05/23/2024    BUN 13 05/23/2024    CREATININE 0.8 05/23/2024    CALCIUM 9.2 05/31/2024    PROT 6.7 05/23/2024    ALBUMIN 3.8 05/23/2024    BILITOT 0.3 05/23/2024    ALKPHOS 83 05/23/2024    AST 16 05/23/2024    ALT 10 05/23/2024    ANIONGAP 5 (L) 05/23/2024    ESTGFRAFRICA >60 03/21/2022    EGFRNONAA >60 03/21/2022    TSH 1.959 01/31/2024     Vit D, 25-Hydroxy   Date Value Ref Range Status   05/31/2024 35 30 - 96 ng/mL Final     Comment:     Vitamin D deficiency.........<10 ng/mL                              Vitamin D insufficiency......10-29 ng/mL       Vitamin D sufficiency........> or equal to 30 ng/mL  Vitamin D toxicity............>100 ng/mL         Assessment and Plan     Localized osteoporosis without current pathological fracture  Prior bone density results have been reviewed including current therapies.  Agree with use of anabolic therapy giving her low T-scores and elevated FRAX.  Infinity would be an option as well but she is not interested due to the cardiac risks that have been seen studies.    Her current risk factors for osteoporotic fractures include known osteoporosis on DEXA, postmenopausal state, low body weight, and possible decreased intestinal absorption of calcium versus increased urinary excretion.  Additional potential past risk factors include prior hepatitis-C infection and prior amounts of alcohol " consumption.  Both of these factors have correlated with worsening rates of osteoporosis.  She states that she has undergone treatment and cure of hepatitis-C per and alcohol consumption has reduced greatly and no longer is a daily habit of hers.    Plan:  Agree with continued use of Tymlos once daily  Should continue supplementation with calcium and vitamin-D  Encourage continued weight-bearing exercise every week and also stressed the importance of fall precautions  She can continue to follow with Dr. Berry as we agree with recommendations and therapies pursued so far  We will likely repeat urine calcium testing to ensure there was no inaccurate results due to missed collections  Prior to ordering labs we will review her case in our upcoming endocrine case conference    Hyperparathyroidism  Prior labs reviewed which have demonstrated normal serum calcium levels and normal vitamin-D levels.  PTH has been elevated on 2 separate occasions.  No known digestive issues causing decreased calcium absorption and celiac testing has been negative.  Urine calcium levels were normal but possibly incorrectly collected per the patient.  There is still the potential for secondary hyperparathyroidism due to renal leak/idiopathic hypercalciuria.  There is still the possibility for undiagnosed absorption issue with calcium through the gut.  Alternative diagnosis would be normocalcemic hyperparathyroidism.      Plan:  Resume calcium supplementation but strive for split dosing of her current supplement (500 mg BID) plus dietary sources  We will provide detailed recommendations of dietary sources of calcium which are preferred over supplementation  Avoid repeat PTH levels at this time given use of Tymlos (noted that prior PTH elevations were before Tymlos was started)  Unlikely to need additional vitamin-D supplementation given prior labs but can remain on vitamin-D that she has recently been taking.    We discussed that if secondary  causes are ruled out than normocalcemic hyperparathyroidism would be the only other reason for the PTH elevations.  We reviewed that pursuing surgery for a diagnosis such as this puts her at risk for hypoparathyroidism and lifelong issues with low calcium requiring supplementation and potentially calcitriol.  She has no desire for surgery if this is her diagnosis.        RTC on as needed basis, can also follow with Dr. Berry as she has done.  Will review in case conference soon and plan labs accordingly.        Amadeo Christensen DO     Disclaimer: This note has been generated using voice-recognition software. There may be typographical errors that have been missed during proof-reading.    The above history labs imaging impression and plan were discussed with attending physician who is in agreement and also took part in this patient's care.  I personally reviewed all of the patients available medications, labs, imaging, vitals, allergies, medical history.

## 2024-08-28 NOTE — PATIENT INSTRUCTIONS
Thank you for visiting with me during our virtual appointment today.    So far I agree with most everything that Dr. Berry she has done for you with regard to your osteoporosis and elevated PTH levels.  The Tymlos is a good medication for helping to increase bone density and ideally should be continued for 2 years.    As mentioned during our visit I do have concerns that the parathyroid hormone elevation may be secondarily elevated from a couple of different causes.  My main concern is that your urine calcium levels may not have been accurate when these were tested earlier in the year.  If the urine collection did not include all the samples from that 24 hour then the calcium level may truly be higher in your urine which can be seen in a small part of the population.  This finding is known as renal leak or idiopathic hypercalciuria.  This essentially causes calcium levels to be excreted through your urine at a level that is too high leading to parathyroid hormone release to try and maintain normal blood calcium levels.  In that instance PTH elevation is appropriate and not concerning.      The other alternative would be an undiagnosed absorption issue with calcium causing parathyroid hormone to be released at higher concentrations to help overcome the absorption concern.  That would also be an appropriate response and not concerning.  Typically in the setting of a digestive calcium absorption issue the urine calcium is on the lower side which was not necessarily seen on the recent labs.  I have included info below about dietary calcium sources and info about supplements but likely you would do best on the current calcium you are taking but split to twice a day so either 3 or 4 pills split up between two different meals should suffice.    Finally the remaining diagnosis which is mainly a diagnosis of exclusion is called normocalcemic hyperparathyroidism.  This is a tricky diagnosis to make given the above things  first need to be ruled out.  As mentioned we have had some patients with this diagnosis in the past who have opted for surgery to attempt to correct this and while some have had decent results, others have had issues with low parathyroid function despite having a skilled surgeon perform the operation.  Issues with hypoparathyroidism include the need for even higher doses of calcium, often 3-4 times a day and additional prescription strength active vitamin D known as calcitriol.  For this reason we get very cautious if anyone has desire for a potential surgery for normocalcemic hyperparathyroidism.      I believe your vitamin-D level is likely not a factor but I would continue at least 0479-9277 IU use of vitamin-D 3 or equivalent vitamin-D dose a day.      I would like to review your prior workup and history with our staff this week and will then be in contact with you on Friday afternoon sometime.  I am considering at least a urine calcium collection to be repeated but want others opinions as there may be other considerations that my colleagues may bring up for us.      After our discussion and looking into the data more, I do believe that the prior Hepatitis C infection could have led to some decrease in bone density over the years and that process should be ended given your treatment of the Hep C.  The other factor that may have been involved in the lower bone density was your prior daily alcohol use.  There is a known correlation to increased alcohol use and worsening bone density.  Likely your occasional alcohol during the week is not playing a role but the prior daily use may have.      Continue to keep going with weight bearing exercise but be careful and try to avoid any falls.      Plan to hear from me again soon.    For calcium intake:  I advise you get 1200 mg per day of calcium, split up over the day into 2 to 4 divided doses.  This amount includes calcium from both dietary sources and/or calcium  supplements, and it is best to get smaller amounts throughout the day rather than all of the calcium at one time; this allows for better absorption of the calcium.     To estimate calcium content from a nutrition label, the label lists the % calcium in that food.   For example, the label for an 8 oz glass of milk lists the calcium content as 30%.  This is equivalent to 300 mg of calcium (multiply the % by 10 to get the mg of calcium per serving).  It is best to try to get the majority of calcium intake from the diet.  I've included a table below of some calcium-rich foods.    If you are not getting enough calcium in the diet, then you can add low doses of calcium supplements.  For calcium supplements, your body can only absorb about 500-600 mg of calcium at one time.  Thus, it is best to split the tablets up over the course of a day.  It is also best to avoid taking calcium supplements at the same time as a calcium-rich food to maximize your calcium absorption.  Calcium carbonate is best taken with or after a meal.  Calcium citrate can be taken on an empty stomach or with/after a meal.  Please look at any multivitamin you are taking as these often usually contain calcium as well.    Estimated Calcium Content of Foods:  Produce  Serving Size Estimated Calcium*    [place] greens, frozen 8 oz 360 mg   Broccoli mei 8 oz 200 mg   Kale, frozen 8 oz 180 mg   Soy Beans, green, boiled 8 oz 175 mg   Bok [place], cooked, boiled 8 oz 160 mg   Figs, dried 2 figs 65 mg   Broccoli, fresh, cooked 8 oz 60 mg   Oranges 1 whole 55 mg   Seafood Serving Size Estimated Calcium*    Sardines, canned with bones 3 oz 325 mg   [place], canned with bones 3 oz 180 mg   Shrimp, canned 3 oz 125 mg   Dairy Serving Size Estimated Calcium*    Ricotta, part-skim 4 oz 335 mg   Yogurt, plain, low-fat 6 oz 310 mg   Milk, skim, low-fat, whole 8 oz 300 mg   Yogurt with fruit, low-fat 6 oz 260 mg   Mozzarella, part-skim 1 oz 210 mg   Cheddar 1 oz 205 mg    Yogurt, Greek 6 oz 200 mg   American Cheese 1 oz 195 mg   Feta Cheese 4 oz 140 mg   Cottage Cheese, 2% 4 oz 105 mg   Frozen yogurt, vanilla 8 oz 105 mg   Ice Cream, vanilla 8 oz 85 mg   Parmesan 1 tbsp 55 mg   Fortified Food Serving Size Estimated Calcium*   [place] milk, rice milk or soy milk, fortified 8 oz 300 mg   Orange juice and other fruit juices, fortified 8 oz 300 mg   Tofu, prepared with calcium 4 oz 205 mg   Waffle, frozen, fortified 2 pieces 200 mg   Oatmeal, fortified 1 packet 140 mg   English muffin, fortified 1 muffin 100 mg   Cereal, fortified 8 oz 100-1,000 mg   Other Serving Size Estimated Calcium*   Mac & cheese, frozen 1 package 325 mg   Pizza, cheese, frozen 1 serving 115 mg   Pudding, chocolate, prepared with 2% milk 4 oz 160 mg   Beans, baked, canned 4 oz 160 mg   *The calcium content listed for most foods is estimated and can vary due to multiple factors. Check the food label to determine how much calcium is in a particular product.  If you read the nutrition label for a food source, it lists the % calcium in that food.  For an 8 oz glass of milk, for example, the label states calcium 30%.  This is equivalent to 300 mg of calcium (multiply the listed number by 10).   **Table from the National [employer] Foundation

## 2024-08-29 NOTE — ASSESSMENT & PLAN NOTE
Prior labs reviewed which have demonstrated normal serum calcium levels and normal vitamin-D levels.  PTH has been elevated on 2 separate occasions.  No known digestive issues causing decreased calcium absorption and celiac testing has been negative.  Urine calcium levels were normal but possibly incorrectly collected per the patient.  There is still the potential for secondary hyperparathyroidism due to renal leak/idiopathic hypercalciuria.  There is still the possibility for undiagnosed absorption issue with calcium through the gut.  Alternative diagnosis would be normocalcemic hyperparathyroidism.      Plan:  Resume calcium supplementation but strive for split dosing of her current supplement (500 mg BID) plus dietary sources  We will provide detailed recommendations of dietary sources of calcium which are preferred over supplementation  Avoid repeat PTH levels at this time given use of Tymlos (noted that prior PTH elevations were before Tymlos was started)  Unlikely to need additional vitamin-D supplementation given prior labs but can remain on vitamin-D that she has recently been taking.    We discussed that if secondary causes are ruled out than normocalcemic hyperparathyroidism would be the only other reason for the PTH elevations.  We reviewed that pursuing surgery for a diagnosis such as this puts her at risk for hypoparathyroidism and lifelong issues with low calcium requiring supplementation and potentially calcitriol.  She has no desire for surgery if this is her diagnosis.

## 2024-08-29 NOTE — ASSESSMENT & PLAN NOTE
Prior bone density results have been reviewed including current therapies.  Agree with use of anabolic therapy giving her low T-scores and elevated FRAX.  Infinity would be an option as well but she is not interested due to the cardiac risks that have been seen studies.    Her current risk factors for osteoporotic fractures include known osteoporosis on DEXA, postmenopausal state, low body weight, and possible decreased intestinal absorption of calcium versus increased urinary excretion.  Additional potential past risk factors include prior hepatitis-C infection and prior amounts of alcohol consumption.  Both of these factors have correlated with worsening rates of osteoporosis.  She states that she has undergone treatment and cure of hepatitis-C per and alcohol consumption has reduced greatly and no longer is a daily habit of hers.    Plan:  Agree with continued use of Tymlos once daily  Should continue supplementation with calcium and vitamin-D  Encourage continued weight-bearing exercise every week and also stressed the importance of fall precautions  She can continue to follow with Dr. Berry as we agree with recommendations and therapies pursued so far  We will likely repeat urine calcium testing to ensure there was no inaccurate results due to missed collections  Prior to ordering labs we will review her case in our upcoming endocrine case conference

## 2024-08-30 ENCOUNTER — PATIENT MESSAGE (OUTPATIENT)
Facility: CLINIC | Age: 69
End: 2024-08-30
Payer: MEDICARE

## 2024-08-30 ENCOUNTER — TELEPHONE (OUTPATIENT)
Dept: ENDOCRINOLOGY | Facility: CLINIC | Age: 69
End: 2024-08-30
Payer: MEDICARE

## 2024-08-30 DIAGNOSIS — E21.3 HYPERPARATHYROIDISM: Primary | ICD-10-CM

## 2024-10-30 ENCOUNTER — LAB VISIT (OUTPATIENT)
Dept: LAB | Facility: OTHER | Age: 69
End: 2024-10-30
Attending: INTERNAL MEDICINE
Payer: MEDICARE

## 2024-10-30 DIAGNOSIS — E55.9 VITAMIN D DEFICIENCY: Primary | ICD-10-CM

## 2024-10-30 DIAGNOSIS — N25.81 SECONDARY HYPERPARATHYROIDISM: ICD-10-CM

## 2024-10-30 DIAGNOSIS — M81.8 DISUSE OSTEOPOROSIS WITHOUT PATHOLOGICAL FRACTURE: ICD-10-CM

## 2024-10-30 LAB
ALBUMIN SERPL BCP-MCNC: 3.7 G/DL (ref 3.5–5.2)
ANION GAP SERPL CALC-SCNC: 8 MMOL/L (ref 8–16)
BUN SERPL-MCNC: 11 MG/DL (ref 8–23)
CALCIUM SERPL-MCNC: 9.6 MG/DL (ref 8.7–10.5)
CHLORIDE SERPL-SCNC: 109 MMOL/L (ref 95–110)
CO2 SERPL-SCNC: 24 MMOL/L (ref 23–29)
CREAT SERPL-MCNC: 0.8 MG/DL (ref 0.5–1.4)
EST. GFR  (NO RACE VARIABLE): >60 ML/MIN/1.73 M^2
GLUCOSE SERPL-MCNC: 81 MG/DL (ref 70–110)
PHOSPHATE SERPL-MCNC: 4 MG/DL (ref 2.7–4.5)
POTASSIUM SERPL-SCNC: 4 MMOL/L (ref 3.5–5.1)
PTH-INTACT SERPL-MCNC: 73 PG/ML (ref 9–77)
SODIUM SERPL-SCNC: 141 MMOL/L (ref 136–145)

## 2024-10-30 PROCEDURE — 83970 ASSAY OF PARATHORMONE: CPT | Performed by: INTERNAL MEDICINE

## 2024-10-30 PROCEDURE — 80069 RENAL FUNCTION PANEL: CPT | Performed by: INTERNAL MEDICINE

## 2024-10-30 PROCEDURE — 36415 COLL VENOUS BLD VENIPUNCTURE: CPT | Performed by: INTERNAL MEDICINE

## 2024-10-30 PROCEDURE — 82306 VITAMIN D 25 HYDROXY: CPT | Performed by: INTERNAL MEDICINE

## 2024-10-31 LAB — 25(OH)D3+25(OH)D2 SERPL-MCNC: 39 NG/ML (ref 30–96)

## 2024-11-05 ENCOUNTER — LAB VISIT (OUTPATIENT)
Dept: LAB | Facility: OTHER | Age: 69
End: 2024-11-05
Attending: STUDENT IN AN ORGANIZED HEALTH CARE EDUCATION/TRAINING PROGRAM
Payer: MEDICARE

## 2024-11-05 DIAGNOSIS — E21.3 HYPERPARATHYROIDISM: ICD-10-CM

## 2024-11-05 LAB
CALCIUM 24H UR-MRATE: 9 MG/HR (ref 4–12)
CALCIUM UR-MCNC: 9.8 MG/DL (ref 0–15)
CALCIUM URINE (MG/SPEC): 225 MG/SPEC
CREAT 24H UR-MRATE: 28.8 MG/HR (ref 40–75)
CREAT UR-MCNC: 30.1 MG/DL (ref 15–325)
CREATININE, URINE (MG/SPEC): 692.3 MG/SPEC
URINE COLLECTION DURATION: 24 HR
URINE COLLECTION DURATION: 24 HR
URINE VOLUME: 2300 ML
URINE VOLUME: 2300 ML

## 2024-11-05 PROCEDURE — 82570 ASSAY OF URINE CREATININE: CPT | Performed by: STUDENT IN AN ORGANIZED HEALTH CARE EDUCATION/TRAINING PROGRAM

## 2024-11-05 PROCEDURE — 82340 ASSAY OF CALCIUM IN URINE: CPT | Performed by: STUDENT IN AN ORGANIZED HEALTH CARE EDUCATION/TRAINING PROGRAM

## 2024-11-08 ENCOUNTER — PATIENT MESSAGE (OUTPATIENT)
Facility: CLINIC | Age: 69
End: 2024-11-08
Payer: MEDICARE

## 2025-03-24 DIAGNOSIS — Z00.00 ENCOUNTER FOR MEDICARE ANNUAL WELLNESS EXAM: ICD-10-CM

## 2025-04-16 ENCOUNTER — TELEPHONE (OUTPATIENT)
Dept: INTERNAL MEDICINE | Facility: CLINIC | Age: 70
End: 2025-04-16
Payer: MEDICARE

## 2025-04-16 NOTE — TELEPHONE ENCOUNTER
----- Message from [place] sent at 4/16/2025  3:11 PM CDT -----  Name of Who is Calling:VINAY VENEGAS [[id number]]  What is the request in detail: pt calling to schedule an annual apt with you, but no time slots came up for me.Please call back to further assist.   Can the clinic reply by [facility]: No  What Number to Call Back if not in [facility]: [phone]

## 2025-06-09 ENCOUNTER — OFFICE VISIT (OUTPATIENT)
Dept: CARDIOLOGY | Facility: CLINIC | Age: 70
End: 2025-06-09
Payer: MEDICARE

## 2025-06-09 VITALS
WEIGHT: 114.19 LBS | RESPIRATION RATE: 15 BRPM | OXYGEN SATURATION: 98 % | DIASTOLIC BLOOD PRESSURE: 96 MMHG | BODY MASS INDEX: 22.42 KG/M2 | SYSTOLIC BLOOD PRESSURE: 156 MMHG | HEART RATE: 65 BPM | HEIGHT: 60 IN

## 2025-06-09 DIAGNOSIS — R07.9 CHEST PAIN, UNSPECIFIED TYPE: ICD-10-CM

## 2025-06-09 DIAGNOSIS — R00.2 PALPITATIONS: ICD-10-CM

## 2025-06-09 DIAGNOSIS — I10 HYPERTENSION, UNSPECIFIED TYPE: Primary | ICD-10-CM

## 2025-06-09 PROCEDURE — G2211 COMPLEX E/M VISIT ADD ON: HCPCS | Mod: S$GLB,,, | Performed by: INTERNAL MEDICINE

## 2025-06-09 PROCEDURE — 3080F DIAST BP >= 90 MM HG: CPT | Mod: CPTII,S$GLB,, | Performed by: INTERNAL MEDICINE

## 2025-06-09 PROCEDURE — 1159F MED LIST DOCD IN RCRD: CPT | Mod: CPTII,S$GLB,, | Performed by: INTERNAL MEDICINE

## 2025-06-09 PROCEDURE — 3077F SYST BP >= 140 MM HG: CPT | Mod: CPTII,S$GLB,, | Performed by: INTERNAL MEDICINE

## 2025-06-09 PROCEDURE — 3288F FALL RISK ASSESSMENT DOCD: CPT | Mod: CPTII,S$GLB,, | Performed by: INTERNAL MEDICINE

## 2025-06-09 PROCEDURE — 99214 OFFICE O/P EST MOD 30 MIN: CPT | Mod: S$GLB,,, | Performed by: INTERNAL MEDICINE

## 2025-06-09 PROCEDURE — 99999 PR PBB SHADOW E&M-EST. PATIENT-LVL IV: CPT | Mod: PBBFAC,,, | Performed by: INTERNAL MEDICINE

## 2025-06-09 PROCEDURE — 93000 ELECTROCARDIOGRAM COMPLETE: CPT | Mod: S$GLB,,, | Performed by: INTERNAL MEDICINE

## 2025-06-09 PROCEDURE — 1101F PT FALLS ASSESS-DOCD LE1/YR: CPT | Mod: CPTII,S$GLB,, | Performed by: INTERNAL MEDICINE

## 2025-06-09 PROCEDURE — 1126F AMNT PAIN NOTED NONE PRSNT: CPT | Mod: CPTII,S$GLB,, | Performed by: INTERNAL MEDICINE

## 2025-06-09 PROCEDURE — 3008F BODY MASS INDEX DOCD: CPT | Mod: CPTII,S$GLB,, | Performed by: INTERNAL MEDICINE

## 2025-06-09 RX ORDER — AMLODIPINE BESYLATE 5 MG/1
5 TABLET ORAL DAILY
Qty: 90 TABLET | Refills: 3 | Status: SHIPPED | OUTPATIENT
Start: 2025-06-09

## 2025-06-09 NOTE — PROGRESS NOTES
CARDIOVASCULAR CONSULTATION    REASON FOR CONSULT:   Anh San is a 69 y.o. female who presents for evaluation    HISTORY OF PRESENT ILLNESS:     Patient is a pleasant 67-year-old lady.  Here for establishing care with Cardiology.  Denies any chest pains at rest on exertion, orthopnea, PND.  Family history of hypertension.  In her left forearm days a small mobile mass.  She is scheduled to get an MRI for that, but has not gotten it done.  She states that she has seen it for the past 6 months at it has stayed stable in size.      Ultrasound arm:      FINDINGS:  Volar aspect of the left wrist: Oval, circumscribed focus measures 4 x 2 mm.  Homogeneous internal echoes.  Internal vascularity documented on color and Doppler images. Lesion is intimately associated with adjacent superficial vessels and appears intraluminal on several images.  Imaging features are nonspecific.  It is possible this represents partially occlusive thrombosis of a superficial vein.  If the abnormality persists or enlarges, correlation with MRI may be considered in further evaluating.     Volar aspect of the forearm: No abnormality identified.     The patient location is: her home  The chief complaint leading to consultation is: fu    Visit type: audiovisual    Face to Face time with patient: 15 min  25 minutes of total time spent on the encounter, which includes face to face time and non-face to face time preparing to see the patient (eg, review of tests), Obtaining and/or reviewing separately obtained history, Documenting clinical information in the electronic or other health record, Independently interpreting results (not separately reported) and communicating results to the patient/family/caregiver, or Care coordination (not separately reported).         Each patient to whom he or she provides medical services by telemedicine is:  (1) informed of the relationship between the physician and patient and the respective role of  any other health care provider with respect to management of the patient; and (2) notified that he or she may decline to receive medical services by telemedicine and may withdraw from such care at any time.    Notes:     Patient doing fine.  Denies any chest pains at rest on exertion, orthopnea, PND.  Results of the tests discussed in detail with the patient     The left ventricle is normal in size with concentric remodeling and normal systolic function.  The estimated ejection fraction is 55%.  Indeterminate left ventricular diastolic function.  Normal right ventricular size with normal right ventricular systolic function.  Mild mitral regurgitation.  Mild tricuspid regurgitation.      Impression:     Corresponding with palpable abnormality (and cardiovascular evaluation including ultrasound) is a 5 mm rounded structure, contiguous with linear tubular structures suggesting vascular etiology.  Venous varix would be a leading consideration.  Chronic thrombophlebitis not excluded.  No definite filling defects.       June 25:    History of Present Illness    CHIEF COMPLAINT:  Anh presents today with concerns about elevated BP    CARDIOVASCULAR:  She experienced palpitations described as racing and LLE pain while in bed 1 week ago, initially attributing it to indigestion. Palpitations occur a few times per week. She denies dizziness, chest pain, dyspnea with exercise, or syncope. She exercises regularly, performing 20-30 minutes on elliptical at the gym and walking at least 20 minutes almost daily.    FAMILY HISTORY:  Family history is significant for cardiovascular disease: sister had two cardiac arrests leading to death, father and sister with HTN, and grandmother with multiple strokes.    GERD:  She has a history of GERD, with previous ER visit in [facility] 3 years ago for LLE chest pain. Currently takes Protonix and Nexium for management. Following a vegetarian diet, she identifies spicy and acidic foods,  particularly [place] cuisine, as dietary triggers that exacerbate symptoms.    DIET AND LIFESTYLE:  She consumes three double shots of espresso in the morning. She reports recent weight gain, which she considers positive given her slender build.             PAST MEDICAL HISTORY:   No past medical history on file.    PAST SURGICAL HISTORY:     Past Surgical History:   Procedure Laterality Date    ADENOIDECTOMY      bladder prolapse surgery      carpel tunnel repair       SECTION      x1 1978    FRACTURE SURGERY      HERNIA REPAIR      TONSILLECTOMY         ALLERGIES AND MEDICATION:   Review of patient's allergies indicates:  No Known Allergies     Medication List            Accurate as of 2025  2:34 PM. If you have any questions, ask your nurse or doctor.                CONTINUE taking these medications      cyanocobalamin 1,000 mcg/mL injection     ergocalciferol 50,000 unit Cap  Commonly known as: ERGOCALCIFEROL     estradioL 0.01 % (0.1 mg/gram) vaginal cream  Commonly known as: ESTRACE     guaiFENesin-codeine 100-10 mg/5 ml  mg/5 mL syrup  Commonly known as: TUSSI-ORGANIDIN NR     pantoprazole 40 MG tablet  Commonly known as: PROTONIX  Take 1 tablet (40 mg total) by mouth once daily.     TYMLOS 80 mcg (3,120 mcg/1.56 mL) Pnij  Generic drug: abaloparatide              SOCIAL HISTORY:     Social History     Socioeconomic History    Marital status: Single   Tobacco Use    Smoking status: Never    Smokeless tobacco: Never   Substance and Sexual Activity    Alcohol use: Not Currently    Drug use: Never    Sexual activity: Not Currently     Partners: Male     Social Drivers of Health     Financial Resource Strain: Low Risk  (2025)    Overall Financial Resource Strain (CARDIA)     Difficulty of Paying Living Expenses: Not hard at all   Food Insecurity: No Food Insecurity (2025)    Hunger Vital Sign     Worried About Running Out of Food in the Last Year: Never true     Ran Out of Food in the  Last Year: Never true   Transportation Needs: No Transportation Needs (6/9/2025)    PRAPARE - Transportation     Lack of Transportation (Medical): No     Lack of Transportation (Non-Medical): No   Physical Activity: Insufficiently Active (6/9/2025)    Exercise Vital Sign     Days of Exercise per Week: 5 days     Minutes of Exercise per Session: 20 min   Stress: No Stress Concern Present (6/9/2025)    [employer] - Occupational Stress Questionnaire     Feeling of Stress : Only a little   Housing Stability: Low Risk  (6/9/2025)    Housing Stability Vital Sign     Unable to Pay for Housing in the Last Year: No     Number of Times Moved in the Last Year: 0     Homeless in the Last Year: No       FAMILY HISTORY:     Family History   Problem Relation Name Age of Onset    Multiple sclerosis Mother          late onset    Hypertension Father      Heart disease Sister      Hypertension Sister      Coronary artery disease Sister          stents    Heart attack Sister      Heart attacks under age 50 Sister      HIV Brother      Depression Son Simeon     Anxiety disorder Son Simeon     No Known Problems Son Maurice     Anxiety disorder Son Alfred     No Known Problems Son Nato     Stroke Maternal Grandmother      Colon cancer Neg Hx      Breast cancer Neg Hx      Ovarian cancer Neg Hx         REVIEW OF SYSTEMS:   Review of Systems   Constitutional: Negative.   HENT: Negative.     Eyes: Negative.    Cardiovascular:  Positive for chest pain and palpitations.   Respiratory: Negative.     Endocrine: Negative.    Hematologic/Lymphatic: Negative.    Skin: Negative.    Musculoskeletal: Negative.    Gastrointestinal: Negative.    Genitourinary: Negative.    Neurological: Negative.    Psychiatric/Behavioral: Negative.     Allergic/Immunologic: Negative.        A 10 point review of systems was performed and all the pertinent positives have been mentioned. Rest of review of systems was negative.        PHYSICAL  "EXAM:     Vitals:    06/09/25 1415   BP: (!) 156/96   Pulse: 65   Resp: 15      Body mass index is 22.3 kg/m².  Weight: 51.8 kg (114 lb 3.2 oz)   Height: 5' (152.4 cm)     Physical Exam  Constitutional:       Appearance: Normal appearance. She is well-developed.   HENT:      Head: Normocephalic.   Eyes:      Pupils: Pupils are equal, round, and reactive to light.   Cardiovascular:      Rate and Rhythm: Normal rate and regular rhythm.   Pulmonary:      Effort: Pulmonary effort is normal.      Breath sounds: Normal breath sounds.   Abdominal:      General: Bowel sounds are normal.      Palpations: Abdomen is soft.      Tenderness: There is no abdominal tenderness.   Musculoskeletal:         General: Normal range of motion.      Cervical back: Normal range of motion and neck supple.   Skin:     General: Skin is warm.   Neurological:      Mental Status: She is alert and oriented to person, place, and time.           DATA:     Laboratory:  CBC:  Recent Labs   Lab 06/27/22  1145 09/18/23  1447 05/23/24  1645   WBC 3.25 L 5.13 4.32   Hemoglobin 13.9 13.8 12.9   Hematocrit 40.6 40.0 37.5   Platelets 235 259 282       CHEMISTRIES:  Recent Labs   Lab 01/31/24  1145 05/23/24  1645 05/31/24  1333 10/30/24  1329   Glucose 91 93  --  81   Sodium 141 138  --  141   Potassium 3.8 4.1  --  4.0   BUN 11 13  --  11   Creatinine 0.7 0.8  --  0.8   Calcium 9.1 8.8 9.2 9.6       CARDIAC BIOMARKERS:        COAGS:        LIPIDS/LFTS:  Recent Labs   Lab 02/17/23  1533 09/18/23  1447 05/23/24  1645   AST 18 17 16   ALT 15 12 10       No results found for: "HGBA1C"    TSH  Recent Labs   Lab 01/31/24  1145   TSH 1.959       The ASCVD Risk score (Juno FINCH, et al., 2019) failed to calculate for the following reasons:    Cannot find a previous HDL lab    Cannot find a previous total cholesterol lab             ASSESSMENT AND PLAN     Patient Active Problem List   Diagnosis    Localized osteoporosis without current pathological fracture    " Neutropenia    Arm mass, left    Chronic neck and back pain    Decreased strength, endurance, and mobility    Posture imbalance    Hyperparathyroidism     Orders Placed This Encounter   Procedures    Nuclear Stress - Cardiology Interpreted    Cardiac event monitor    IN OFFICE EKG 12-LEAD (to [place])    Echo         Assessment & Plan    PLAN SUMMARY:  - Ordered 30-day event monitor for palpitations assessment  - Initiated Norvasc 5 mg daily for blood pressure, with potential increase to 10 mg  - Ordered echocardiogram to reassess interventricular septum thickness  - Continued Protonix (or Nexium) for GERD  - Ordered stress test to evaluate atypical chest pain  - Follow up after completing ordered tests to review results and adjust treatment plan    ESSENTIAL HYPERTENSION:  - Anh to monitor blood pressure daily using home blood pressure machine, reduce caffeine intake, particularly the multiple espresso shots in the morning.  - Initiated Norvasc 5 mg daily for blood pressure, with instructions to increase to 10 mg daily if blood pressure remains elevated.  - Ordered echocardiogram to reassess interventricular septum thickness.    PALPITATIONS:  - Ordered event monitor for 30 days to assess palpitations.    ATYPICAL CHEST PAIN:  - Ordered stress test to evaluate atypical chest pain.    GASTROESOPHAGEAL REFLUX DISEASE (GERD):  - Continued Protonix (or Nexium) for GERD, to be taken 30 minutes before eating or drinking anything, daily in the morning.    FOLLOW-UP:  - Follow up after completing ordered tests to review results and adjust treatment plan as needed.                   Thank you very much for involving me in the care of your patient.  Please do not hesitate to contact me if there are any questions.      Karen Acevedo MD, FACC, [facility]  Interventional Cardiologist, [facility].       Visit today included increased complexity associated with the care of the episodic problem chest pain palpitations  hypertension addressed and managing the longitudinal care of the patient due to the serious and/or complex managed problem(s) Problem List[1]  .    This note was dictated with the help of speech recognition software.  There might be un-intended errors and/or substitutions.                         [1]   Patient Active Problem List  Diagnosis    Localized osteoporosis without current pathological fracture    Neutropenia    Arm mass, left    Chronic neck and back pain    Decreased strength, endurance, and mobility    Posture imbalance    Hyperparathyroidism

## 2025-06-10 LAB
OHS QRS DURATION: 100 MS
OHS QTC CALCULATION: 411 MS

## 2025-06-16 ENCOUNTER — LAB VISIT (OUTPATIENT)
Dept: LAB | Facility: OTHER | Age: 70
End: 2025-06-16
Attending: INTERNAL MEDICINE
Payer: MEDICARE

## 2025-06-16 ENCOUNTER — OFFICE VISIT (OUTPATIENT)
Dept: INTERNAL MEDICINE | Facility: CLINIC | Age: 70
End: 2025-06-16
Payer: MEDICARE

## 2025-06-16 VITALS
OXYGEN SATURATION: 97 % | HEIGHT: 60 IN | SYSTOLIC BLOOD PRESSURE: 130 MMHG | DIASTOLIC BLOOD PRESSURE: 79 MMHG | WEIGHT: 111.13 LBS | BODY MASS INDEX: 21.82 KG/M2 | HEART RATE: 64 BPM

## 2025-06-16 DIAGNOSIS — E21.3 HYPERPARATHYROIDISM: ICD-10-CM

## 2025-06-16 DIAGNOSIS — Z00.00 ROUTINE GENERAL MEDICAL EXAMINATION AT A HEALTH CARE FACILITY: ICD-10-CM

## 2025-06-16 DIAGNOSIS — Z12.11 SCREENING FOR COLORECTAL CANCER: ICD-10-CM

## 2025-06-16 DIAGNOSIS — Z00.00 ROUTINE GENERAL MEDICAL EXAMINATION AT A HEALTH CARE FACILITY: Primary | ICD-10-CM

## 2025-06-16 DIAGNOSIS — Z12.12 SCREENING FOR COLORECTAL CANCER: ICD-10-CM

## 2025-06-16 DIAGNOSIS — M54.81 OCCIPITAL NEURALGIA, UNSPECIFIED LATERALITY: ICD-10-CM

## 2025-06-16 DIAGNOSIS — Z12.31 OTHER SCREENING MAMMOGRAM: ICD-10-CM

## 2025-06-16 DIAGNOSIS — M81.6 LOCALIZED OSTEOPOROSIS WITHOUT CURRENT PATHOLOGICAL FRACTURE: ICD-10-CM

## 2025-06-16 DIAGNOSIS — I10 ESSENTIAL HYPERTENSION: ICD-10-CM

## 2025-06-16 LAB
ABSOLUTE EOSINOPHIL (OHS): 0.29 K/UL
ABSOLUTE MONOCYTE (OHS): 0.42 K/UL (ref 0.3–1)
ABSOLUTE NEUTROPHIL COUNT (OHS): 1.04 K/UL (ref 1.8–7.7)
ALBUMIN SERPL BCP-MCNC: 3.8 G/DL (ref 3.5–5.2)
ALP SERPL-CCNC: 94 UNIT/L (ref 40–150)
ALT SERPL W/O P-5'-P-CCNC: 13 UNIT/L (ref 10–44)
ANION GAP (OHS): 11 MMOL/L (ref 8–16)
AST SERPL-CCNC: 18 UNIT/L (ref 11–45)
BASOPHILS # BLD AUTO: 0.03 K/UL
BASOPHILS NFR BLD AUTO: 1 %
BILIRUB SERPL-MCNC: 0.4 MG/DL (ref 0.1–1)
BUN SERPL-MCNC: 13 MG/DL (ref 8–23)
CALCIUM SERPL-MCNC: 9.3 MG/DL (ref 8.7–10.5)
CHLORIDE SERPL-SCNC: 108 MMOL/L (ref 95–110)
CHOLEST SERPL-MCNC: 198 MG/DL (ref 120–199)
CHOLEST/HDLC SERPL: 2.9 {RATIO} (ref 2–5)
CO2 SERPL-SCNC: 23 MMOL/L (ref 23–29)
CREAT SERPL-MCNC: 0.8 MG/DL (ref 0.5–1.4)
EAG (OHS): 103 MG/DL (ref 68–131)
ERYTHROCYTE [DISTWIDTH] IN BLOOD BY AUTOMATED COUNT: 12.4 % (ref 11.5–14.5)
GFR SERPLBLD CREATININE-BSD FMLA CKD-EPI: >60 ML/MIN/1.73/M2
GLUCOSE SERPL-MCNC: 93 MG/DL (ref 70–110)
HBA1C MFR BLD: 5.2 % (ref 4–5.6)
HCT VFR BLD AUTO: 39 % (ref 37–48.5)
HDLC SERPL-MCNC: 69 MG/DL (ref 40–75)
HDLC SERPL: 34.8 % (ref 20–50)
HGB BLD-MCNC: 13.6 GM/DL (ref 12–16)
IMM GRANULOCYTES # BLD AUTO: 0 K/UL (ref 0–0.04)
IMM GRANULOCYTES NFR BLD AUTO: 0 % (ref 0–0.5)
LDLC SERPL CALC-MCNC: 118 MG/DL (ref 63–159)
LYMPHOCYTES # BLD AUTO: 1.23 K/UL (ref 1–4.8)
MCH RBC QN AUTO: 32 PG (ref 27–31)
MCHC RBC AUTO-ENTMCNC: 34.9 G/DL (ref 32–36)
MCV RBC AUTO: 92 FL (ref 82–98)
NONHDLC SERPL-MCNC: 129 MG/DL
NUCLEATED RBC (/100WBC) (OHS): 0 /100 WBC
PLATELET # BLD AUTO: 233 K/UL (ref 150–450)
PMV BLD AUTO: 9.9 FL (ref 9.2–12.9)
POTASSIUM SERPL-SCNC: 4.1 MMOL/L (ref 3.5–5.1)
PROT SERPL-MCNC: 7.2 GM/DL (ref 6–8.4)
RBC # BLD AUTO: 4.25 M/UL (ref 4–5.4)
RELATIVE EOSINOPHIL (OHS): 9.6 %
RELATIVE LYMPHOCYTE (OHS): 40.9 % (ref 18–48)
RELATIVE MONOCYTE (OHS): 14 % (ref 4–15)
RELATIVE NEUTROPHIL (OHS): 34.5 % (ref 38–73)
SODIUM SERPL-SCNC: 142 MMOL/L (ref 136–145)
TRIGL SERPL-MCNC: 55 MG/DL (ref 30–150)
TSH SERPL-ACNC: 1.73 UIU/ML (ref 0.4–4)
WBC # BLD AUTO: 3.01 K/UL (ref 3.9–12.7)

## 2025-06-16 PROCEDURE — 1101F PT FALLS ASSESS-DOCD LE1/YR: CPT | Mod: CPTII,S$GLB,, | Performed by: INTERNAL MEDICINE

## 2025-06-16 PROCEDURE — 1126F AMNT PAIN NOTED NONE PRSNT: CPT | Mod: CPTII,S$GLB,, | Performed by: INTERNAL MEDICINE

## 2025-06-16 PROCEDURE — 3075F SYST BP GE 130 - 139MM HG: CPT | Mod: CPTII,S$GLB,, | Performed by: INTERNAL MEDICINE

## 2025-06-16 PROCEDURE — 85025 COMPLETE CBC W/AUTO DIFF WBC: CPT

## 2025-06-16 PROCEDURE — 84443 ASSAY THYROID STIM HORMONE: CPT

## 2025-06-16 PROCEDURE — 99213 OFFICE O/P EST LOW 20 MIN: CPT | Mod: 25,S$GLB,, | Performed by: INTERNAL MEDICINE

## 2025-06-16 PROCEDURE — 36415 COLL VENOUS BLD VENIPUNCTURE: CPT

## 2025-06-16 PROCEDURE — 3288F FALL RISK ASSESSMENT DOCD: CPT | Mod: CPTII,S$GLB,, | Performed by: INTERNAL MEDICINE

## 2025-06-16 PROCEDURE — 99999 PR PBB SHADOW E&M-EST. PATIENT-LVL III: CPT | Mod: PBBFAC,,, | Performed by: INTERNAL MEDICINE

## 2025-06-16 PROCEDURE — 82465 ASSAY BLD/SERUM CHOLESTEROL: CPT

## 2025-06-16 PROCEDURE — 83036 HEMOGLOBIN GLYCOSYLATED A1C: CPT

## 2025-06-16 PROCEDURE — 1159F MED LIST DOCD IN RCRD: CPT | Mod: CPTII,S$GLB,, | Performed by: INTERNAL MEDICINE

## 2025-06-16 PROCEDURE — 1160F RVW MEDS BY RX/DR IN RCRD: CPT | Mod: CPTII,S$GLB,, | Performed by: INTERNAL MEDICINE

## 2025-06-16 PROCEDURE — 82374 ASSAY BLOOD CARBON DIOXIDE: CPT

## 2025-06-16 PROCEDURE — 3078F DIAST BP <80 MM HG: CPT | Mod: CPTII,S$GLB,, | Performed by: INTERNAL MEDICINE

## 2025-06-16 PROCEDURE — 3008F BODY MASS INDEX DOCD: CPT | Mod: CPTII,S$GLB,, | Performed by: INTERNAL MEDICINE

## 2025-06-16 PROCEDURE — 99397 PER PM REEVAL EST PAT 65+ YR: CPT | Mod: S$GLB,,, | Performed by: INTERNAL MEDICINE

## 2025-06-16 RX ORDER — CALCIUM CARBONATE 500(1250)
1 TABLET ORAL 2 TIMES DAILY
COMMUNITY

## 2025-06-16 RX ORDER — CHOLECALCIFEROL (VITAMIN D3) 25 MCG
1000 TABLET ORAL
COMMUNITY

## 2025-06-16 RX ORDER — TRIAMCINOLONE ACETONIDE 1 MG/G
CREAM TOPICAL 2 TIMES DAILY
COMMUNITY
Start: 2025-02-03

## 2025-06-16 NOTE — PROGRESS NOTES
Subjective:      Patient ID: Anh San is a 69 y.o. female.    Chief Complaint: Establish Care      Anh San is a 69 y.o. female with chronic conditions significant for HTN, hyperparathyroidism, osteoporosis, hx of treated hep C  Presenting today for ECA     The patient consents verbally to being recorded for [employer] service today.     History of Present Illness    CHIEF COMPLAINT:  Ms. San presents today for follow-up due to age-related health concerns.    CARDIOVASCULAR:  She is under cardiologist care. Echo from 2 years ago showed thickened heart valve and elevated interventricular septum. She reports recent onset of hypertension with readings up to 145. Family history significant for sister who had two heart attacks and  from cardiac infection, and family history of hypertension.    GERD:  She has history of GERD since age 28, with initial emergency room visit at [facility]. Most recently, she had an emergency room visit in [facility] for daily chest pain, diagnosed as GERD.    MEDICAL HISTORY:  She has history of treated and cured Hepatitis C. She has hyperparathyroidism and osteoporosis, with osteoporosis believed to be related to previous Hepatitis C infection. She has cervical injury resulting in muscle damage with subsequent bone spurs in neck. She reports fluctuating white blood cell counts between normal and low levels.    FAMILY HISTORY:  Mother diagnosed with late-onset multiple sclerosis in her 70s.    SOCIAL HISTORY:  She has been vegetarian since age 15. She exercises regularly and maintains an active lifestyle. She denies smoking. She reports minimal alcohol consumption, noting intolerance after one cocktail.    ---------------------------  This part of the note is separate from the annual visit as this a new and separate problem(s) that was discussed during this visit. This section of the note is pertains to and is billable  "as part of the modifier 25.    CURRENT SYMPTOMS:  She reports headaches lasting 2-2.5 days with pain localized behind the eye. She describes a sensation in the back of her head possibly consistent with occipital neuralgia. She denies sinus symptoms or allergies.      ROS:  Cardiovascular: +chest pressure  Gastrointestinal: +indigestion  Skin: +itching  Neurological: +headache, +tingling          Current Medications[1]    No results found for: "HGBA1C"  No results found for: "MICALBCREAT"  Lab Results   Component Value Date    LDLCALC 105.0 2022    CHOL 199 2022    HDL 83 (H) 2022    TRIG 55 2022       Lab Results   Component Value Date     2025    K 4.1 2025     2025    CO2 23 2025    GLU 93 2025    BUN 13 2025    CREATININE 0.8 2025    CALCIUM 9.3 2025    PROT 7.2 2025    ALBUMIN 3.8 2025    BILITOT 0.4 2025    ALKPHOS 94 2025    AST 18 2025    ALT 13 2025    ANIONGAP 11 2025    ESTGFRAFRICA >60 2022    EGFRNONAA >60 2022    WBC 3.01 (L) 2025    HGB 13.6 2025    HGB 12.9 2024    HCT 39.0 2025    MCV 92 2025     2025    TSH 1.959 2024       Lab Results   Component Value Date    AHAMEIJD16HF 39 10/30/2024    OBYGGRSO97UR 35 2024    SPZYXRLN96 472 2022         History reviewed. No pertinent past medical history.  Past Surgical History:   Procedure Laterality Date    ADENOIDECTOMY      APPENDECTOMY      bladder prolapse surgery      carpel tunnel repair       SECTION      x1     FRACTURE SURGERY      HERNIA REPAIR      TONSILLECTOMY       Social History     Social History Narrative    Not on file     Family History   Problem Relation Name Age of Onset    Multiple sclerosis Mother          late onset    Hypertension Father Edward San     Depression Father Edward San     Early death Father Edward San     Heart " disease Sister Thu San     Hypertension Sister Thu San     Coronary artery disease Sister Thu San         stents    Heart attack Sister Thu San     Heart attacks under age 50 Sister Thu San     HIV Brother Dami San     Early death Brother Dami San     Depression Son Simeon     Anxiety disorder Son Simeon     No Known Problems Son Maurice     Anxiety disorder Son Alfred     No Known Problems Son Nato     Stroke Maternal Grandmother Stacey Fuhr     Vision loss Maternal Grandfather Kermit Fuhr         Vision loss in one eye    Depression Son Simeon Jonas     Mental illness Son Roberth Jonas         Severe Anxiety    Depression Son Simeon Jonas     Depression Son Roberth Jonas     Drug abuse Son Roberth Jonas     Mental illness Son Roberth Jonas     Colon cancer Neg Hx      Breast cancer Neg Hx      Ovarian cancer Neg Hx           Vitals:    06/16/25 0848   BP: 130/79   Pulse: 64   SpO2: 97%   Weight: 50.4 kg (111 lb 1.8 oz)   Height: 5' (1.524 m)   PainSc: 0-No pain     Objective:      Physical Exam  Vitals reviewed.   Constitutional:       Appearance: Normal appearance.   HENT:      Head: Normocephalic.      Right Ear: Tympanic membrane, ear canal and external ear normal.      Left Ear: Tympanic membrane, ear canal and external ear normal.      Nose: Nose normal.      Mouth/Throat:      Mouth: Mucous membranes are moist.      Pharynx: Oropharynx is clear.   Eyes:      Conjunctiva/sclera: Conjunctivae normal.      Pupils: Pupils are equal, round, and reactive to light.   Cardiovascular:      Rate and Rhythm: Normal rate and regular rhythm.      Pulses: Normal pulses.   Pulmonary:      Effort: Pulmonary effort is normal.      Breath sounds: Normal breath sounds.   Abdominal:      General: Abdomen is flat. Bowel sounds are normal.      Palpations: Abdomen is soft.   Musculoskeletal:      Cervical back: Neck supple.   Skin:     General: Skin is warm.   Neurological:      General: No focal deficit  present.      Mental Status: She is alert.   Psychiatric:         Mood and Affect: Mood normal.         Assessment/Plan     Assessment & Plan    - Assessed cardiovascular concerns, including reported hypertension and family history.  - Evaluated headache symptoms, considering potential causes such as occipital neuralgia.  - Reviewed history of GERD and its potential impact on chest pain symptoms.  - Considered hyperparathyroidism and osteoporosis risk in overall health assessment.  - Determined need for routine health maintenance screenings and labs.    PLAN SUMMARY:  - Order mammogram and Cologuard test for colorectal cancer screening  - Order comprehensive labs including CBC, electrolytes, liver function tests, lipid panel, thyroid function tests, and diabetes screening  - Continue vitamin D supplementation  - Continue using topical cream for neural symptoms  - Continue Nexium as needed for GERD symptom management  - Continue antihypertensive medication as prescribed by cardiologist  - Cardiologist to perform echocardiogram and stress test  - Follow up with endocrinologist, Dr. Berry, for hyperparathyroidism management    ESSENTIAL HYPERTENSION:  - Noted patient reports high blood pressure readings, including 145 mmHg recently.  - This hypertension is a new development.  - Ms. San is seeing a cardiologist for management and has been prescribed antihypertensive medication (amlodipine 5mg qday).    MITRAL VALVE DISORDER:  - Documented patient has a thickened mitral valve, as noted by cardiologist.  - The cardiologist plans to perform an echocardiogram to evaluate the heart valve condition and has ordered a stress test to further assess cardiac function.    GASTROESOPHAGEAL REFLUX DISEASE (GERD):  - Documented patient's long-standing GERD problem since age 28, causing chest pain and pressure.  - Discussed typical presentation of heart attack symptoms to help patient differentiate from GERD symptoms.  - Advised to  continue Nexium as needed for symptom management.    HYPERPARATHYROIDISM:  - Documented patient's history of hyperparathyroidism with fluctuating PTH levels, currently monitored by endocrinologist, Dr. Berry.  - Assessed that this condition may be linked to past Hepatitis C treatment, affecting bone health.  - Ms. San takes vitamin D as part of management.    OSTEOPOROSIS:  - Ms. San has osteoporosis and is at high risk for further bone loss.  - Condition is monitored by endocrinologist, Dr. Berry.  - Management includes regular monitoring and vitamin D supplementation, which patient is advised to continue.    CERVICALGIA:  - Ms. San reports cervical pain related to past cervical trauma.  - Assessed pain may be related to osteophytes and previous injury.  - Advised patient to consider cervicalgia as a possible cause of symptoms rather than multiple sclerosis.  - Ms. San uses topical cream for symptom management.    VERTEBRAL OSTEOPHYTES:  - Documented osteophytes in the cervical spine, possibly contributing to cervical pain.  - These may be related to past cervical trauma and occupational strain.  - Advised patient to consider osteophytes as a possible cause of cervical pain.  - Ms. San uses topical cream for related neural symptoms.    HEADACHE/OCCIPITAL NEURALGIA:  - Ms. San experiences headaches lasting 2 to 2.5 days, with periorbital pain and posterior cranial tingling.  - Assessed this may be related to past cervical trauma and stress.  - Advised patient to consider occipital neuralgia as a possible cause of symptoms, rather than multiple sclerosis.  - Ms. San uses a topical cream for neural symptoms.    WHITE BLOOD CELL DISORDER:  - Documented patient has fluctuating white blood cell counts, alternating between normal and low.  - This is considered a normal variation by previous physician.  - Advised patient to continue monitoring white blood cell counts as part of regular laboratory  studies.    HISTORY OF HEPATITIS C:  - Documented patient has a history of Hepatitis C, which was cured with treatment.    FAMILY HISTORY OF CARDIOVASCULAR DISEASE:  - Documented patient's sister had 2 myocardial infarctions and  of a cardiac infection.  - Family has a history of hypertension and genetic cardiac issues.  - Discussed typical presentation of myocardial infarction symptoms to help patient differentiate from GERD.    FAMILY HISTORY OF NEUROLOGICAL DISEASE:  - Documented patient's mother developed late-onset multiple sclerosis at age 70, experiencing neurological problems and hemiparesis.       Routine general medical examination at a health care facility  -     Hemoglobin A1C; Future; Expected date: 2025  -     CBC Auto Differential; Future; Expected date: 2025  -     Comprehensive Metabolic Panel; Future; Expected date: 2025  -     TSH; Future; Expected date: 2025  -     Mammo Digital Screening Bilat; Future; Expected date: 2025  -     Lipid Panel; Future; Expected date: 2025    Other screening mammogram  -     Mammo Digital Screening Bilat; Future; Expected date: 2025    Essential hypertension  -     CBC Auto Differential; Future; Expected date: 2025  -     Comprehensive Metabolic Panel; Future; Expected date: 2025  -     TSH; Future; Expected date: 2025  -     Lipid Panel; Future; Expected date: 2025    Screening for colorectal cancer  -     Cologuard Screening (Multitarget Stool DNA); Future; Expected date: 2025    Hyperparathyroidism    Localized osteoporosis without current pathological fracture    Occipital neuralgia, unspecified laterality        Chronic conditions status updated as per HPI.  Other than changes above, cont current medications and maintain follow up with specialists.  Return to clinic in Follow up in about 1 year (around 2026).      Janie Hein MD  [facility]    Total time spent on this  encounter: 32 minutes. This includes face to face time and non-face to face time preparing to see the patient (eg, review of tests), obtaining and/or reviewing separately obtained history, documenting clinical information in the electronic or other health record, independently interpreting results and communicating results to the patient/family/caregiver, or care coordinator.    This note was generated with the assistance of ambient listening technology. Verbal consent was obtained by the patient and accompanying visitor(s) for the recording of patient appointment to facilitate this note. I attest to having reviewed and edited the generated note for accuracy, though some syntax or spelling errors may persist. Please contact the author of this note for any clarification.       There are no Patient Instructions on file for this visit.  Tests to Keep You Healthy    Mammogram: Met on 7/10/2024  Colon Cancer Screening: ORDERED  Last Blood Pressure <= 139/89 (6/16/2025): Yes                                 [1]   Current Outpatient Medications:     amLODIPine (NORVASC) 5 MG tablet, Take 1 tablet (5 mg total) by mouth once daily., Disp: 90 tablet, Rfl: 3    estradioL (ESTRACE) 0.01 % (0.1 mg/gram) vaginal cream, Place 2 g vaginally., Disp: , Rfl:     triamcinolone acetonide 0.1% (KENALOG) 0.1 % cream, Apply topically 2 (two) times daily., Disp: , Rfl:     TYMLOS 80 mcg (3,120 mcg/1.56 mL) PnIj, 80 mcg., Disp: , Rfl:     calcium carbonate (OS-JEANNE) 500 mg calcium (1,250 mg) tablet, Take 1 tablet by mouth 2 (two) times daily., Disp: , Rfl:     pantoprazole (PROTONIX) 40 MG tablet, Take 1 tablet (40 mg total) by mouth once daily., Disp: 28 tablet, Rfl: 0    vitamin D (VITAMIN D3) 1000 units Tab, Take 1,000 Units by mouth., Disp: , Rfl:

## 2025-06-17 ENCOUNTER — RESULTS FOLLOW-UP (OUTPATIENT)
Dept: INTERNAL MEDICINE | Facility: CLINIC | Age: 70
End: 2025-06-17

## 2025-07-09 DIAGNOSIS — Z78.0 MENOPAUSE: ICD-10-CM
